# Patient Record
Sex: FEMALE | Race: WHITE | NOT HISPANIC OR LATINO | Employment: FULL TIME | URBAN - METROPOLITAN AREA
[De-identification: names, ages, dates, MRNs, and addresses within clinical notes are randomized per-mention and may not be internally consistent; named-entity substitution may affect disease eponyms.]

---

## 2018-11-14 ENCOUNTER — OFFICE VISIT (OUTPATIENT)
Dept: GYNECOLOGIC ONCOLOGY | Facility: CLINIC | Age: 23
End: 2018-11-14

## 2018-11-14 VITALS
RESPIRATION RATE: 16 BRPM | HEIGHT: 64 IN | WEIGHT: 160 LBS | DIASTOLIC BLOOD PRESSURE: 64 MMHG | HEART RATE: 106 BPM | SYSTOLIC BLOOD PRESSURE: 123 MMHG | OXYGEN SATURATION: 99 % | BODY MASS INDEX: 27.31 KG/M2 | TEMPERATURE: 98.2 F

## 2018-11-14 DIAGNOSIS — D03.8 MELANOMA IN SITU OF OTHER SITE (HCC): Primary | ICD-10-CM

## 2018-11-14 PROBLEM — D03.9 MELANOMA IN SITU (HCC): Status: ACTIVE | Noted: 2018-11-14

## 2018-11-14 PROCEDURE — 99204 OFFICE O/P NEW MOD 45 MIN: CPT | Performed by: OBSTETRICS & GYNECOLOGY

## 2018-11-14 NOTE — PROGRESS NOTES
Gauri Hester  [id number]  1995      Reason for visit:  Vulvar mole, melanoma in situ     Consultation:  Patient is being seen at the request of Dr. Cory Luu    History of present illness:  The patient is a 23 y.o. year old female who presents today for treatment and evaluation of the above issues.    Patient reports approximately 1.5 years ago she first noticed an abnormal mole on her right labia. She reports she noticed it several years prior to that and it was small and light brown in color. When she saw her primary OBGYN for an annual exam 1.5 years ago, she reports her provider told her it was bigger and darker in color and needed biopsy. She underwent biopsy in 2017. Pathology was significant for junctional melanocytic proliferation present at the margin site. She did experience breakdown and infection of the biopsy site that required antibiotics. In 2017, she was noted to be asymptomatic and pigmentation was gone. She was planning to proceed with another excisional procedure. She was then evaluated by a dermatologist in 2018. Per review of the chart, Dr. Bonilla (dermatology) noted absence of tissue of the lower third of the labia minora where the biopsy was performed. No pigmentation was noted at that time. A second opinion of the pathology report read the vulvar biopsy as malignant melanoma in situ.   The mole has not recently caused her any pain or discomfort. It does not itch or bleed. She denies irregular bleeding and dyspareunia.     For new patients, [facility] intake form was reviewed and confirmed today.    OBGYN History:  She is a .  She does use HRT. She does not have a history of abnormal pap smears. She has regular menses. She is not using birth control but is not actively trying to achieve pregnancy.       Oncologic History:   No history exists.         Past Medical History:   Diagnosis Date   • Melanoma in situ (CMS/HCC)        Past Surgical History:    Procedure Laterality Date   • VULVA SURGERY Right     excision RT vulva of melanoma in situ       MEDICATIONS: The current medication list was reviewed with the patient and updated in the EMR this date per the Medical Assistant. Medication dosages and frequencies were confirmed to be accurate.      Allergies:  has No Known Allergies.    Social History:   Social History     Socioeconomic History   • Marital status:      Spouse name: Not on file   • Number of children: 0   • Years of education: Not on file   • Highest education level: Not on file   Social Needs   • Financial resource strain: Not on file   • Food insecurity - worry: Not on file   • Food insecurity - inability: Not on file   • Transportation needs - medical: Not on file   • Transportation needs - non-medical: Not on file   Occupational History   • Not on file   Tobacco Use   • Smoking status: Not on file   Substance and Sexual Activity   • Alcohol use: Not on file   • Drug use: Not on file   • Sexual activity: Yes     Partners: Male     Birth control/protection: None   Other Topics Concern   • Not on file   Social History Narrative   • Not on file       Family History:  History reviewed. No pertinent family history.    Health Maintenance:    Health Maintenance   Topic Date Due   • ANNUAL PHYSICAL  11/05/1997   • HPV VACCINES (1 - Female 3-dose series) 11/05/2005   • TDAP/TD VACCINES (1 - Tdap) 11/05/2013   • INFLUENZA VACCINE  08/01/2018   • PAP SMEAR  11/14/2018         Review of Systems   Constitutional: Negative for appetite change, chills, fatigue, fever and unexpected weight change.   HENT: Negative for congestion, hearing loss and sore throat.    Eyes: Negative for redness and visual disturbance.   Respiratory: Negative for cough, shortness of breath and wheezing.    Cardiovascular: Negative for chest pain, palpitations and leg swelling.   Gastrointestinal: Negative for abdominal distention, abdominal pain, blood in stool, constipation,  "diarrhea, nausea and vomiting.   Endocrine: Negative for cold intolerance and heat intolerance.   Genitourinary: Positive for frequency. Negative for difficulty urinating, dyspareunia, dysuria, genital sores, hematuria, pelvic pain, urgency, vaginal bleeding, vaginal discharge and vaginal pain.   Musculoskeletal: Negative for arthralgias, back pain, gait problem and joint swelling.   Skin: Negative for rash and wound.   Allergic/Immunologic: Negative for food allergies and immunocompromised state.   Neurological: Negative for dizziness, seizures, syncope, weakness, light-headedness, numbness and headaches.   Hematological: Negative for adenopathy. Does not bruise/bleed easily.   Psychiatric/Behavioral: Negative for confusion, dysphoric mood and sleep disturbance. The patient is not nervous/anxious.        Physical Exam    Vitals:    11/14/18 1418   BP: 123/64   Pulse: 106   Resp: 16   Temp: 98.2 °F (36.8 °C)   TempSrc: Temporal   SpO2: 99%   Weight: 72.6 kg (160 lb)   Height: 161.3 cm (63.5\")     Body mass index is 27.9 kg/m².    Wt Readings from Last 3 Encounters:   11/14/18 72.6 kg (160 lb)         GENERAL: Alert, well-appearing female appearing her stated age who is in no apparent distress.   HEENT: Sclera anicteric. Head normocephalic, atraumatic. Mucus membranes moist.   NECK: Trachea midline, supple, without masses.    BREASTS: Deferred  CARDIOVASCULAR: Normal rate, regular rhythm, no murmurs, rubs, or gallops.  No peripheral edema.  RESPIRATORY: Clear to auscultation bilaterally, normal respiratory effort  BACK:  No CVA tenderness, no vertebral tenderness on palpation  GASTROINTESTINAL:  Abdomen is soft, non-tender, non-distended, no rebound or guarding, no masses, or hernias. No HSM.   SKIN:  Warm, dry, well-perfused.  All visible areas intact.  No rashes, lesions, ulcers.  PSYCHIATRIC: AO x3, with appropriate affect, normal thought processes.  NEUROLOGIC: No focal deficits.  Moves extremities " well.  MUSCULOSKELETAL: Normal gait and station.   EXTREMITIES:   No cyanosis, clubbing, symmetric.  LYMPHATICS:  No cervical or inguinal adenopathy noted.     PELVIC exam:    GYNECOLOGIC:  External genitalia: A small divot on the right labia minora where biopsy was performed was noted. No pigmentation visualized. Slight discoloration of tissue at the anterior edge of the biopsy site noted. On speculum examination, the cervix was free from lesion. No vaginal lesions noted.   On bimanual examination no mass was appreciated.  Uterus was normal in size and shape. There is no cervical motion or uterine tenderness. No cervical mass was palpated. Parametria were smooth. Rectovaginal exam was deferred.       Diagnostic Data:    Outside hospital pathology report personally reviewed by Dr. Mitchell and significant for melanoma in situ.           Assessment/Plan   This is a 23 y.o. woman with a history of vulvar melanoma in situ, + margin at prior excision, slightly abnormal pigmentation on examination today.  Discussed with patient and her  management options including expectant management versus surgical management with wide local excision. Recommended an excisional procedure given diagnosis of melanoma in situ and preference to achieve negative margins. Patient agreeable to the plan and desires surgical management. Discussed with patient the options of performing the procedure in the office versus in the outpatient surgery center. Patient states that she would be too nervous to have the procedure performed while she was awake. Will schedule in the outpatient surgery center. Patient advised to take one week off from work. Wound care discussed at length with the patient. She was prescribed inter dry and instructed how to use it. Patient was counseled that secondary to the location of the excision, completely symmetrical labia might not be able to be created for cosmesis. Patient expressed understanding.     Patient  was consented for wide local excision of the vulva.     Risks and benefits of surgery were discussed.  This included, but was not limited to, infection and bleeding like when the skin is cut; damage to surrounding structures; and incisional complications.  Risk of DVT was addressed for major surgeries.  Standard of care efforts to minimize these risks were reviewed.  Typical hospital stay and recovery were discussed as well as post-procedure precautions.  Surgical implications of chronic illnesses on recovery and surgical outcome were reviewed.     Patient verbalized understanding of the plan including the risks and benefits.  Appropriate perioperative testing including laboratory evaluation, EKG as clinically indicated, chest x-ray as clinically indicated, and preadmission evaluation were all ordered as a part of this patient's care.    Comorbidities  - None     Orders Placed This Encounter   Procedures   • SCANNED - LABS       FOLLOW UP: Surgery     Note: Speech recognition transcription software was used to dictate portions of this document.  An attempt at proofreading has been made though minor errors in transcription may still be present.  Please do not hesitate to call our office with any questions.    I personally spent 45 minutes face-to-face with the patient of which >50% was spent performing counseling/coordiantion of care.      Patient was seen and examined with Dr. Christopher,  resident, who performed portions of the examination and documentation for this patient's care under my direct supervision.  I agree with the above documentation and plan.    Samina Mitchell MD  11/14/18  9:43 PM

## 2018-11-16 ENCOUNTER — PREP FOR SURGERY (OUTPATIENT)
Dept: GYNECOLOGIC ONCOLOGY | Facility: CLINIC | Age: 23
End: 2018-11-16

## 2018-11-16 ENCOUNTER — PATIENT EDUCATION (SURGERY INSTRUCTIONS) (OUTPATIENT)
Dept: GYNECOLOGIC ONCOLOGY | Facility: CLINIC | Age: 23
End: 2018-11-16

## 2018-11-16 DIAGNOSIS — D03.9 MELANOMA IN SITU, UNSPECIFIED SITE (HCC): Primary | ICD-10-CM

## 2018-11-16 NOTE — PATIENT INSTRUCTIONS
Outpatient Pre-op Patient Education  *See checked boxes for your instructions*    Gauri Hester  [id number]  1995    SURGEON: Dr. Mitchell    Appointment  [x]  1. Your surgery has been scheduled on 11-26-18 at [facility] located at [address]. You will need to be there at 12:00 pm.   [x] 2.  You have pre-admission testing (PAT) appointment on 11-25-18 at 4:45 pm.  You will need to be at hospital registration 10 minutes before that time.     [x] 3.  The registration department is located in the long hallway between the [address].       The Day(s) Before Surgery  [x] 1.  Do not drink alcohol or smoke.     [x] 2.  Do not take vitamins or aspirin one week before surgery.       [x] 3.  If you are ill on the days leading up to your surgery, please call our office.      [x] 4.  If you are using medications for diabetes, call the physician who manages these and get instructions on how they should be taken before and after surgery.    [x] 5.  Nothing to eat or drink after midnight on 11-25-18.      [x] 6.  Please make prior arrangements for someone to drive you home after your procedure        The Day of Surgery  [x] 1.  Do not eat, drink, or chew gum.     [x] 2.  On the morning of your surgery, you may take her prescription medications with a sip of water. Bring all medication with you to the surgery center. (Diabetic patients should bring insulin if instructed to do so by their diabetes managing physician).   [x] 3. Bathe or shower the morning of your surgery. Do not use powders, lotions, or creams. Deodorants are okay.     [x] 4. Wear loose, comfortable clothing.     [x] 5. Bring holders for glasses, contacts, or dentures.      [x] 6. Bring any required payment and forms, including insurance cards. Leave all money and valuables at home.        Post-surgery Instructions  [x] 1.  For the first 24 hours, rest and take periodic deep breaths to remove anesthetic agents  from your body.   [x] 2.  Follow any specific instructions relevant to your particular surgery.     [x] 3.  Limit activity to avoid stress to the surgical site.      [x] 4.  Keep all dressings dry. Shower or bathe as instructed by your doctor.     [x] 5.  Drink and eat light foods. Remain on liquids alone only if nausea and vomiting occur. Return to your regular diet gradually, as tolerance allows.    [x] 6. Avoid alcohol for at least 24 hours.      [x] 7.  Take prescription pain medication as directed and with food.      [x] 8.  Call our office after discharge from the surgery center to make your post-op appointment.        In Case of Emergency:  Call our office if you experience any of the following:  - Excessive drainage, bleeding, swelling, or redness at the incision site  - Severe pain not eased by pain medication  - Temperature above 101  - Persistent nausea or vomiting  - Skin rash or general body itching

## 2018-11-19 ENCOUNTER — TELEPHONE (OUTPATIENT)
Dept: GYNECOLOGIC ONCOLOGY | Facility: CLINIC | Age: 23
End: 2018-11-19

## 2018-11-19 NOTE — TELEPHONE ENCOUNTER
Orders faxed successfully to [facility] for pt's upcoming outpt procedure 11-26-18 with Dr. Mitchell.

## 2018-11-25 ENCOUNTER — APPOINTMENT (OUTPATIENT)
Dept: PREADMISSION TESTING | Facility: HOSPITAL | Age: 23
End: 2018-11-25

## 2018-11-25 DIAGNOSIS — D03.9 MELANOMA IN SITU, UNSPECIFIED SITE (HCC): ICD-10-CM

## 2018-11-25 LAB
ALBUMIN SERPL-MCNC: 4.51 G/DL (ref 3.2–4.8)
ALBUMIN/GLOB SERPL: 2.1 G/DL (ref 1.5–2.5)
ALP SERPL-CCNC: 59 U/L (ref 25–100)
ALT SERPL W P-5'-P-CCNC: 19 U/L (ref 7–40)
ANION GAP SERPL CALCULATED.3IONS-SCNC: 4 MMOL/L (ref 3–11)
AST SERPL-CCNC: 17 U/L (ref 0–33)
BASOPHILS # BLD AUTO: 0.03 10*3/MM3 (ref 0–0.2)
BASOPHILS NFR BLD AUTO: 0.4 % (ref 0–1)
BILIRUB SERPL-MCNC: 0.4 MG/DL (ref 0.3–1.2)
BUN BLD-MCNC: 15 MG/DL (ref 9–23)
BUN/CREAT SERPL: 20 (ref 7–25)
CALCIUM SPEC-SCNC: 9.3 MG/DL (ref 8.7–10.4)
CHLORIDE SERPL-SCNC: 108 MMOL/L (ref 99–109)
CO2 SERPL-SCNC: 28 MMOL/L (ref 20–31)
CREAT BLD-MCNC: 0.75 MG/DL (ref 0.6–1.3)
DEPRECATED RDW RBC AUTO: 40.5 FL (ref 37–54)
EOSINOPHIL # BLD AUTO: 0.13 10*3/MM3 (ref 0–0.3)
EOSINOPHIL NFR BLD AUTO: 1.8 % (ref 0–3)
ERYTHROCYTE [DISTWIDTH] IN BLOOD BY AUTOMATED COUNT: 12.9 % (ref 11.3–14.5)
GFR SERPL CREATININE-BSD FRML MDRD: 95 ML/MIN/1.73
GLOBULIN UR ELPH-MCNC: 2.2 GM/DL
GLUCOSE BLD-MCNC: 94 MG/DL (ref 70–100)
HCG INTACT+B SERPL-ACNC: <5 MIU/ML
HCT VFR BLD AUTO: 38.6 % (ref 34.5–44)
HGB BLD-MCNC: 12.5 G/DL (ref 11.5–15.5)
IMM GRANULOCYTES # BLD: 0.01 10*3/MM3 (ref 0–0.03)
IMM GRANULOCYTES NFR BLD: 0.1 % (ref 0–0.6)
LYMPHOCYTES # BLD AUTO: 1.81 10*3/MM3 (ref 0.6–4.8)
LYMPHOCYTES NFR BLD AUTO: 25.2 % (ref 24–44)
MCH RBC QN AUTO: 27.9 PG (ref 27–31)
MCHC RBC AUTO-ENTMCNC: 32.4 G/DL (ref 32–36)
MCV RBC AUTO: 86.2 FL (ref 80–99)
MONOCYTES # BLD AUTO: 0.51 10*3/MM3 (ref 0–1)
MONOCYTES NFR BLD AUTO: 7.1 % (ref 0–12)
NEUTROPHILS # BLD AUTO: 4.69 10*3/MM3 (ref 1.5–8.3)
NEUTROPHILS NFR BLD AUTO: 65.5 % (ref 41–71)
PLATELET # BLD AUTO: 286 10*3/MM3 (ref 150–450)
PMV BLD AUTO: 10.5 FL (ref 6–12)
POTASSIUM BLD-SCNC: 3.9 MMOL/L (ref 3.5–5.5)
PROT SERPL-MCNC: 6.7 G/DL (ref 5.7–8.2)
RBC # BLD AUTO: 4.48 10*6/MM3 (ref 3.89–5.14)
SODIUM BLD-SCNC: 140 MMOL/L (ref 132–146)
WBC NRBC COR # BLD: 7.17 10*3/MM3 (ref 3.5–10.8)

## 2018-11-25 PROCEDURE — 36415 COLL VENOUS BLD VENIPUNCTURE: CPT

## 2018-11-25 PROCEDURE — 84702 CHORIONIC GONADOTROPIN TEST: CPT | Performed by: OBSTETRICS & GYNECOLOGY

## 2018-11-25 PROCEDURE — 85025 COMPLETE CBC W/AUTO DIFF WBC: CPT | Performed by: OBSTETRICS & GYNECOLOGY

## 2018-11-25 PROCEDURE — 80053 COMPREHEN METABOLIC PANEL: CPT | Performed by: OBSTETRICS & GYNECOLOGY

## 2018-11-26 ENCOUNTER — DOCUMENTATION (OUTPATIENT)
Dept: GYNECOLOGIC ONCOLOGY | Facility: CLINIC | Age: 23
End: 2018-11-26

## 2018-11-26 ENCOUNTER — OUTSIDE FACILITY SERVICE (OUTPATIENT)
Dept: GYNECOLOGIC ONCOLOGY | Facility: CLINIC | Age: 23
End: 2018-11-26

## 2018-11-26 ENCOUNTER — LAB REQUISITION (OUTPATIENT)
Dept: LAB | Facility: HOSPITAL | Age: 23
End: 2018-11-26

## 2018-11-26 DIAGNOSIS — D03.9 MELANOMA IN SITU (HCC): ICD-10-CM

## 2018-11-26 PROCEDURE — 56620 VULVECTOMY SIMPLE PARTIAL: CPT | Performed by: OBSTETRICS & GYNECOLOGY

## 2018-11-26 PROCEDURE — 88305 TISSUE EXAM BY PATHOLOGIST: CPT | Performed by: OBSTETRICS & GYNECOLOGY

## 2018-11-26 NOTE — PROGRESS NOTES
[facility]             [employer] PT SPOUSE FMLA FORMS AT    FORMS TO BE FAXED FOR HIM....     PLEASE CALL SPOUSE ONCE FORMS ARE PICKED UP OUT MA BOX (WORRIED NOT GETTING TURNED IN)     NO FORM FEE WAS COLLECTED     WANTED TO TALK TO RAMONA     FORMS PLACED IN MA BOX

## 2018-11-26 NOTE — PROGRESS NOTES
FMLA done today for .    Ranjana Farley signed and this was faxed to [employer] [place].       11--01-  6 weeks.         Paperwork for patient re faxed today. Just in case it wasn't received.

## 2018-11-27 LAB
CYTO UR: NORMAL
LAB AP CASE REPORT: NORMAL
LAB AP CLINICAL INFORMATION: NORMAL
PATH REPORT.FINAL DX SPEC: NORMAL
PATH REPORT.GROSS SPEC: NORMAL

## 2018-11-30 ENCOUNTER — TELEPHONE (OUTPATIENT)
Dept: GYNECOLOGIC ONCOLOGY | Facility: CLINIC | Age: 23
End: 2018-11-30

## 2018-11-30 NOTE — TELEPHONE ENCOUNTER
Samina Mitchell MD  [facility]             pls notify pt of path:  No melanoma      11/30/18:  I called to inform pt of the above results.  No answer.  I left her a message to call our office back.     11/30/18:  Pt called back.  Informed her above.  She verbalized understanding.

## 2018-12-03 ENCOUNTER — OFFICE VISIT (OUTPATIENT)
Dept: GYNECOLOGIC ONCOLOGY | Facility: CLINIC | Age: 23
End: 2018-12-03

## 2018-12-03 VITALS
DIASTOLIC BLOOD PRESSURE: 78 MMHG | SYSTOLIC BLOOD PRESSURE: 111 MMHG | BODY MASS INDEX: 28.07 KG/M2 | RESPIRATION RATE: 12 BRPM | OXYGEN SATURATION: 98 % | TEMPERATURE: 98.1 F | WEIGHT: 161 LBS | HEART RATE: 112 BPM

## 2018-12-03 DIAGNOSIS — Z98.890 POST-OPERATIVE STATE: Primary | ICD-10-CM

## 2018-12-03 PROCEDURE — 99024 POSTOP FOLLOW-UP VISIT: CPT | Performed by: NURSE PRACTITIONER

## 2018-12-03 NOTE — PROGRESS NOTES
GYN ONCOLOGY FOLLOW-UP    Gauri Hester  [id number]  1995    Chief Complaint: Post-op        History of present illness:  Gauri Hester is a 23 y.o. year old female who is here today for post-op visit. She is accompanied by her . She is s/p right wide local vulvar excision by Dr. Mitchell on 11/26/2018. Pathology returned showing residual focal melanocytic hyperplasia. Previous biopsy showed in-situ melanoma at this location. She feels she is healing very well and reports that this experience was much better than her previous procedure in the area. She has been practicing good home wound care and denies complications. She is not needing pain medication. She denies pain, drainage, swelling, or fevers. She would like to stay on with our office for ongoing observation and routine gynecologic care for now. She is not using birth control as she and her  are desiring of pregnancy. If pregnancy in the near future, she would like to continue with [facility] through [facility].          Past Medical History:   Diagnosis Date   • Melanoma in situ (CMS/HCC)        Past Surgical History:   Procedure Laterality Date   • VULVA SURGERY Right     excision RT vulva of melanoma in situ       MEDICATIONS: The current medication list was reviewed and reconciled.     Allergies:  has No Known Allergies.    History reviewed. No pertinent family history.      Review of Systems   Constitutional: Negative for appetite change, chills, fatigue, fever and unexpected weight change.   Respiratory: Negative for cough, shortness of breath and wheezing.    Cardiovascular: Negative for chest pain, palpitations and leg swelling.   Gastrointestinal: Negative for abdominal distention, abdominal pain, blood in stool, constipation, diarrhea, nausea and vomiting.   Endocrine: Negative.    Genitourinary: Negative for dyspareunia, dysuria, frequency, genital sores, hematuria, pelvic pain, urgency, vaginal bleeding, vaginal  discharge and vaginal pain.   Musculoskeletal: Negative for arthralgias, gait problem and joint swelling.   Neurological: Negative for dizziness, seizures, syncope, weakness, light-headedness, numbness and headaches.   Hematological: Negative for adenopathy.   Psychiatric/Behavioral: Negative.        Physical Exam  Vital Signs: /78   Pulse 112   Temp 98.1 °F (36.7 °C) (Temporal)   Resp 12   Wt 73 kg (161 lb)   SpO2 98%   BMI 28.07 kg/m²    General Appearance:  alert, cooperative, no apparent distress and appears stated age   Neurologic/Psychiatric: A&O x 3, gait steady, appropriate affect   HEENT:  Normocephalic, without obvious abnormality, mucous membranes moist   Lungs:   Clear to auscultation bilaterally; respirations regular, even, and unlabored bilaterally   Heart:  Regular rate and rhythm, no murmurs appreciated   Breasts:  deferred   Abdomen:   Soft, non-tender, non-distended and no organomegaly   Lymph nodes: No inguinal adenopathy noted   Extremities: Normal, atraumatic; no clubbing, cyanosis, or edema    Pelvic: External Genitalia  without lesions. Right labia minora excision site well approximated and healing appropriately. No erythema, edema, drainage, or induration noted.    Vagina  is pink, moist, without lesions.   Cervix  normal, without lesions, no discharge and no CMT  Uterus  normal size, midposition, mobile and nontender  Ovaries  without palpable masses or fullness  Parametria  smooth  Rectovaginal  Female rectovaginal: deferred         Procedure Notes:  No notes on file    Assessment and Plan:    Gauri was seen today for post-op.    Diagnoses and all orders for this visit:    Post-operative state        Patient, her , and I reviewed her pathology results. She is healing well and excision site is very well approximated. I feel that she is safe to return to work at this time. Encouraged to continue good home wound care as previously instructed and notify us of any concerns.  I am happy to continue to follow her in surveillance and for routine Gyn care until OB is needed. She is encouraged to take daily PNV if she is off of contraception and actively desiring of pregnancy. She v/u.     Return to clinic in 3 months for surveillance of vulvar melanoma in-situ.      Ranjana Farley, JOSHUA      Note: Speech recognition transcription software was used to dictate portions of this document.  An attempt at proofreading has been made though minor errors in transcription may still be present.  Please do not hesitate to call our office with any questions.

## 2019-03-11 ENCOUNTER — OFFICE VISIT (OUTPATIENT)
Dept: GYNECOLOGIC ONCOLOGY | Facility: CLINIC | Age: 24
End: 2019-03-11

## 2019-03-11 VITALS
BODY MASS INDEX: 25.28 KG/M2 | RESPIRATION RATE: 12 BRPM | TEMPERATURE: 97.7 F | OXYGEN SATURATION: 98 % | SYSTOLIC BLOOD PRESSURE: 119 MMHG | WEIGHT: 145 LBS | HEART RATE: 78 BPM | DIASTOLIC BLOOD PRESSURE: 72 MMHG

## 2019-03-11 DIAGNOSIS — D03.9 MELANOMA IN SITU, UNSPECIFIED SITE (HCC): Primary | ICD-10-CM

## 2019-03-11 PROCEDURE — 99213 OFFICE O/P EST LOW 20 MIN: CPT | Performed by: NURSE PRACTITIONER

## 2019-03-11 NOTE — PROGRESS NOTES
GYN ONCOLOGY CANCER SURVEILLANCE FOLLOW-UP    Gauri Hester  [id number]  1995    Chief Complaint: Follow-up (no complaints)        History of present illness:  Gauri Hester is a 23 y.o. year old female who is here today for ongoing surveillance of vulvar melanoma in situ, see Cancer History.  She is s/p right wide local vulvar excision by Dr. Mitchell on 11/26/2018. Pathology returned showing residual focal melanocytic hyperplasia. Previous biopsy showed in-situ melanoma at this location. She reports she is feeling very well today and has no complaints. She denies new lesions or any other gynecologic concerns. She is planning to establish care with [facility] soon for routine care and hopefully future OB care.          Cancer History:      Melanoma in situ (CMS/HCC)    11/14/2018 Initial Diagnosis     Melanoma in situ (CMS/HCC)         11/26/2018 Surgery     Surgery: Samina Mitchell MD    WLE of Right Vulva:      RIGHT LABIA MINORA LESION, EXCISIONAL BIOPSY:  Focal melanocytic hyperplasia; negative for in-situ and invasive melanoma              Past Medical History:   Diagnosis Date   • Melanoma in situ (CMS/HCC)        Past Surgical History:   Procedure Laterality Date   • VULVA SURGERY Right     excision RT vulva of melanoma in situ       MEDICATIONS: The current medication list was reviewed and reconciled.     Allergies:  has No Known Allergies.    No family history on file.      Review of Systems   Constitutional: Negative for appetite change, chills, fatigue, fever and unexpected weight change.   Respiratory: Negative for cough, shortness of breath and wheezing.    Cardiovascular: Negative for chest pain, palpitations and leg swelling.   Gastrointestinal: Negative for abdominal distention, abdominal pain, blood in stool, constipation, diarrhea, nausea and vomiting.   Endocrine: Negative.    Genitourinary: Negative for dyspareunia, dysuria, frequency, genital sores, hematuria, pelvic pain, urgency, vaginal  bleeding, vaginal discharge and vaginal pain.   Musculoskeletal: Negative for arthralgias, gait problem and joint swelling.   Neurological: Negative for dizziness, seizures, syncope, weakness, light-headedness, numbness and headaches.   Hematological: Negative for adenopathy.   Psychiatric/Behavioral: Negative.        Physical Exam  Vital Signs: /72   Pulse 78   Temp 97.7 °F (36.5 °C) (Temporal)   Resp 12   Wt 65.8 kg (145 lb)   SpO2 98%   BMI 25.28 kg/m²    General Appearance:  alert, cooperative, no apparent distress, appears stated age and normal weight   Neurologic/Psychiatric: A&O x 3, gait steady, appropriate affect   HEENT:  Normocephalic, without obvious abnormality, mucous membranes moist   Lungs:   Clear to auscultation bilaterally; respirations regular, even, and unlabored bilaterally   Heart:  Regular rate and rhythm, no murmurs appreciated   Breasts:  deferred   Abdomen:   Soft, non-tender, non-distended and no organomegaly   Lymph nodes: No inguinal adenopathy noted   Extremities: Normal, atraumatic; no clubbing, cyanosis, or edema    Pelvic: External Genitalia  without lesions or skin changes  Vagina  is pink, moist, without lesions.   Cervix  normal, without lesions, no discharge and no CMT  Uterus  normal size, midposition, mobile and nontender  Ovaries  without palpable masses or fullness  Parametria  smooth  Rectovaginal  Female rectovaginal: deferred       Procedure Note:  No notes on file      Assessment and Plan:  Gauri was seen today for follow-up.    Diagnoses and all orders for this visit:    Melanoma in situ, unspecified site (CMS/HCC)        There is no evidence of disease or recurrent lesions upon today's exam. She is understanding to call with any new lesions, changes in pelvic symptoms, or general GYN concerns at any time between regularly scheduled visits.       Return to clinic in 6 months for ongoing surveillance.      Ranjana Farley, JOSHUA        Note: Speech  recognition transcription software was used to dictate portions of this document.  An attempt at proofreading has been made though minor errors in transcription may still be present.  Please do not hesitate to call our office with any questions.

## 2019-09-09 ENCOUNTER — OFFICE VISIT (OUTPATIENT)
Dept: GYNECOLOGIC ONCOLOGY | Facility: CLINIC | Age: 24
End: 2019-09-09

## 2019-09-09 VITALS
SYSTOLIC BLOOD PRESSURE: 123 MMHG | TEMPERATURE: 98.1 F | HEART RATE: 104 BPM | RESPIRATION RATE: 16 BRPM | OXYGEN SATURATION: 99 % | WEIGHT: 137 LBS | BODY MASS INDEX: 23.89 KG/M2 | DIASTOLIC BLOOD PRESSURE: 71 MMHG

## 2019-09-09 DIAGNOSIS — R10.32 LLQ PAIN: ICD-10-CM

## 2019-09-09 DIAGNOSIS — D03.8 MELANOMA IN SITU OF OTHER SITE (HCC): Primary | ICD-10-CM

## 2019-09-09 PROCEDURE — 99214 OFFICE O/P EST MOD 30 MIN: CPT | Performed by: NURSE PRACTITIONER

## 2019-09-09 NOTE — PROGRESS NOTES
GYN ONCOLOGY FOLLOW-UP    Gauri Hester  [id number]  1995    Chief Complaint: Follow-up (pelvic discomfort, elly with intercourse)        History of present illness:  Gauri Hester is a 23 y.o. 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 She is s/p right wide local vulvar excision by Dr. Mitchell on 11/26/2018. Pathology returned showing residual focal melanocytic hyperplasia. Previous biopsy showed in-situ melanoma at this location. Since our last visit she has been following a low-carb diet, down 8 lbs. She c/o intermittent LLQ pain and sharp pelvic pain, comes and goes, worse with intercourse. Denies acute pain today. Patient denies new vulvar lesions.   Patient and her  planning to attempt conception next year. She is currently awaiting new patient appointment with [facility] in 11/2019 to establish OBGYN care.       Oncology History:       Melanoma in situ (CMS/HCC)    11/14/2018 Initial Diagnosis     Melanoma in situ (CMS/HCC)         11/26/2018 Surgery     Surgery: Samina Mitchell MD    WLE of Right Vulva:      RIGHT LABIA MINORA LESION, EXCISIONAL BIOPSY:  Focal melanocytic hyperplasia; negative for in-situ and invasive melanoma              Past Medical History:   Diagnosis Date   • Melanoma in situ (CMS/HCC)        Past Surgical History:   Procedure Laterality Date   • VULVA SURGERY Right     excision RT vulva of melanoma in situ       MEDICATIONS: The current medication list was reviewed and reconciled.     Allergies:  has No Known Allergies.    History reviewed. No pertinent family history.      Review of Systems   Constitutional: Negative for appetite change, chills, fatigue, fever and unexpected weight change.   Respiratory: Negative for cough, shortness of breath and wheezing.    Cardiovascular: Negative for chest pain, palpitations and leg swelling.   Gastrointestinal: Negative for abdominal distention, abdominal pain, blood in stool, constipation, diarrhea,  nausea and vomiting.   Endocrine: Negative.    Genitourinary: Positive for pelvic pain (occ with intercourse). Negative for dyspareunia, dysuria, frequency, genital sores, hematuria, urgency, vaginal bleeding, vaginal discharge and vaginal pain.   Musculoskeletal: Negative for arthralgias, gait problem and joint swelling.   Neurological: Negative for dizziness, seizures, syncope, weakness, light-headedness, numbness and headaches.   Hematological: Negative for adenopathy.   Psychiatric/Behavioral: Negative.        Physical Exam  Vital Signs: /71   Pulse 104   Temp 98.1 °F (36.7 °C) (Temporal)   Resp 16   Wt 62.1 kg (137 lb)   SpO2 99%   BMI 23.89 kg/m²   Pain Score    09/09/19 1322   PainSc: 0-No pain      General Appearance:  alert, cooperative, no apparent distress, appears stated age and normal weight   Neurologic/Psychiatric: A&O x 3, gait steady, appropriate affect   HEENT:  Normocephalic, without obvious abnormality, mucous membranes moist   Lungs:   Clear to auscultation bilaterally; respirations regular, even, and unlabored bilaterally   Heart:  Regular rate and rhythm, no murmurs appreciated   Breasts:  deferred   Abdomen:   Soft, non-tender, non-distended and no organomegaly   Lymph nodes: No inguinal adenopathy noted   Extremities: Normal, atraumatic; no clubbing, cyanosis, or edema    Pelvic: External Genitalia  without lesions or skin changes, changes consistent with prior excision  Vagina  is pink, moist, without lesions.   Cervix  normal, without lesions, no discharge and no CMT  Uterus  normal size, midposition, mobile and nontender  Ovaries  without palpable masses or fullness  Parametria  smooth  Rectovaginal  Female rectovaginal: deferred         Procedure Notes:  No notes on file    Assessment and Plan:    Gauri was seen today for follow-up.    Diagnoses and all orders for this visit:    Melanoma in situ of other site (CMS/HCC)    LLQ pain  -     US Non-ob Transvaginal;  Future        There is no evidence of disease or new lesions upon today's exam. We will continue to see her every 6 months in surveillance for until 2 years from her surgery. After that time, she may return to routine gyn care. She is understanding to call with any changes in pelvic symptoms or general GYN concerns at any time between regularly scheduled visits.     Discussed pt c/o LLQ pain. No tenderness or palpable masses upon bimanual exam. TVUS ordered for further evaluation. She will be notified of all results upon their return.     Keep new patient appointment with C scheduled in 11/2019.       Return to clinic in 6 months for ongoing surveillance.      JOSHUA Fry

## 2020-08-25 ENCOUNTER — TELEPHONE (OUTPATIENT)
Dept: OBSTETRICS AND GYNECOLOGY | Facility: CLINIC | Age: 25
End: 2020-08-25

## 2020-08-25 NOTE — TELEPHONE ENCOUNTER
DANILO scheduled with Dr. Lora on 9/30/2020, first pregnancy.  [phone] patient called complains of nausea and vomiting, she's been in the bed because she hasn't been able to keep anything down in over a day. I advised patient to go to the ER for IV fluids. Patient verbalized understanding.

## 2020-09-28 DIAGNOSIS — Z36.89 ENCOUNTER TO ESTABLISH GESTATIONAL AGE USING ULTRASOUND: Primary | ICD-10-CM

## 2020-09-30 ENCOUNTER — LAB (OUTPATIENT)
Dept: LAB | Facility: HOSPITAL | Age: 25
End: 2020-09-30

## 2020-09-30 ENCOUNTER — INITIAL PRENATAL (OUTPATIENT)
Dept: OBSTETRICS AND GYNECOLOGY | Facility: CLINIC | Age: 25
End: 2020-09-30

## 2020-09-30 ENCOUNTER — RESULTS ENCOUNTER (OUTPATIENT)
Dept: OBSTETRICS AND GYNECOLOGY | Facility: CLINIC | Age: 25
End: 2020-09-30

## 2020-09-30 VITALS — WEIGHT: 139 LBS | DIASTOLIC BLOOD PRESSURE: 62 MMHG | BODY MASS INDEX: 24.24 KG/M2 | SYSTOLIC BLOOD PRESSURE: 102 MMHG

## 2020-09-30 DIAGNOSIS — O21.9 NAUSEA AND VOMITING DURING PREGNANCY PRIOR TO 22 WEEKS GESTATION: ICD-10-CM

## 2020-09-30 DIAGNOSIS — Z34.01 ENCOUNTER FOR SUPERVISION OF NORMAL FIRST PREGNANCY IN FIRST TRIMESTER: ICD-10-CM

## 2020-09-30 DIAGNOSIS — Z34.01 ENCOUNTER FOR SUPERVISION OF NORMAL FIRST PREGNANCY IN FIRST TRIMESTER: Primary | ICD-10-CM

## 2020-09-30 LAB
ABO GROUP BLD: NORMAL
BACTERIA UR QL AUTO: ABNORMAL /HPF
BASOPHILS # BLD AUTO: 0.03 10*3/MM3 (ref 0–0.2)
BASOPHILS NFR BLD AUTO: 0.4 % (ref 0–1.5)
BILIRUB UR QL STRIP: NEGATIVE
BLD GP AB SCN SERPL QL: NEGATIVE
C TRACH RRNA SPEC DONR QL NAA+PROBE: NEGATIVE
CLARITY UR: ABNORMAL
COLOR UR: ABNORMAL
DEPRECATED RDW RBC AUTO: 38.4 FL (ref 37–54)
EOSINOPHIL # BLD AUTO: 0.09 10*3/MM3 (ref 0–0.4)
EOSINOPHIL NFR BLD AUTO: 1.3 % (ref 0.3–6.2)
ERYTHROCYTE [DISTWIDTH] IN BLOOD BY AUTOMATED COUNT: 12.7 % (ref 12.3–15.4)
GLUCOSE UR STRIP-MCNC: NEGATIVE MG/DL
HBA1C MFR BLD: 5.07 % (ref 4.8–5.6)
HBV SURFACE AG SERPL QL IA: NORMAL
HCT VFR BLD AUTO: 35.4 % (ref 34–46.6)
HCV AB SER DONR QL: NORMAL
HGB BLD-MCNC: 12.2 G/DL (ref 12–15.9)
HGB UR QL STRIP.AUTO: NEGATIVE
HIV1+2 AB SER QL: NORMAL
HYALINE CASTS UR QL AUTO: ABNORMAL /LPF
IMM GRANULOCYTES # BLD AUTO: 0.02 10*3/MM3 (ref 0–0.05)
IMM GRANULOCYTES NFR BLD AUTO: 0.3 % (ref 0–0.5)
KETONES UR QL STRIP: NEGATIVE
LEUKOCYTE ESTERASE UR QL STRIP.AUTO: ABNORMAL
LYMPHOCYTES # BLD AUTO: 1.5 10*3/MM3 (ref 0.7–3.1)
LYMPHOCYTES NFR BLD AUTO: 22.4 % (ref 19.6–45.3)
MCH RBC QN AUTO: 29 PG (ref 26.6–33)
MCHC RBC AUTO-ENTMCNC: 34.5 G/DL (ref 31.5–35.7)
MCV RBC AUTO: 84.1 FL (ref 79–97)
MONOCYTES # BLD AUTO: 0.5 10*3/MM3 (ref 0.1–0.9)
MONOCYTES NFR BLD AUTO: 7.5 % (ref 5–12)
N GONORRHOEA DNA SPEC QL NAA+PROBE: NEGATIVE
NEUTROPHILS NFR BLD AUTO: 4.56 10*3/MM3 (ref 1.7–7)
NEUTROPHILS NFR BLD AUTO: 68.1 % (ref 42.7–76)
NITRITE UR QL STRIP: NEGATIVE
NRBC BLD AUTO-RTO: 0 /100 WBC (ref 0–0.2)
PH UR STRIP.AUTO: 6.5 [PH] (ref 5–8)
PLATELET # BLD AUTO: 232 10*3/MM3 (ref 140–450)
PMV BLD AUTO: 12.2 FL (ref 6–12)
PROT UR QL STRIP: ABNORMAL
RBC # BLD AUTO: 4.21 10*6/MM3 (ref 3.77–5.28)
RBC # UR: ABNORMAL /HPF
REF LAB TEST METHOD: ABNORMAL
RH BLD: POSITIVE
SP GR UR STRIP: >=1.03 (ref 1–1.03)
SQUAMOUS #/AREA URNS HPF: ABNORMAL /HPF
TSH SERPL DL<=0.05 MIU/L-ACNC: 0.63 UIU/ML (ref 0.27–4.2)
UROBILINOGEN UR QL STRIP: ABNORMAL
WBC # BLD AUTO: 6.7 10*3/MM3 (ref 3.4–10.8)
WBC UR QL AUTO: ABNORMAL /HPF

## 2020-09-30 PROCEDURE — 36415 COLL VENOUS BLD VENIPUNCTURE: CPT

## 2020-09-30 PROCEDURE — 81001 URINALYSIS AUTO W/SCOPE: CPT | Performed by: OBSTETRICS & GYNECOLOGY

## 2020-09-30 PROCEDURE — 86901 BLOOD TYPING SEROLOGIC RH(D): CPT

## 2020-09-30 PROCEDURE — 86900 BLOOD TYPING SEROLOGIC ABO: CPT

## 2020-09-30 PROCEDURE — 84443 ASSAY THYROID STIM HORMONE: CPT

## 2020-09-30 PROCEDURE — 86850 RBC ANTIBODY SCREEN: CPT

## 2020-09-30 PROCEDURE — 83036 HEMOGLOBIN GLYCOSYLATED A1C: CPT

## 2020-09-30 PROCEDURE — 80081 OBSTETRIC PANEL INC HIV TSTG: CPT

## 2020-09-30 PROCEDURE — 86803 HEPATITIS C AB TEST: CPT

## 2020-09-30 PROCEDURE — 0501F PRENATAL FLOW SHEET: CPT | Performed by: OBSTETRICS & GYNECOLOGY

## 2020-09-30 RX ORDER — PROMETHAZINE HYDROCHLORIDE 25 MG/1
25 TABLET ORAL EVERY 6 HOURS PRN
Qty: 30 TABLET | Refills: 5 | Status: SHIPPED | OUTPATIENT
Start: 2020-09-30 | End: 2021-03-09 | Stop reason: HOSPADM

## 2020-09-30 RX ORDER — ONDANSETRON 4 MG/1
4 TABLET, FILM COATED ORAL DAILY PRN
Qty: 30 TABLET | Refills: 4 | Status: SHIPPED | OUTPATIENT
Start: 2020-09-30 | End: 2021-03-09 | Stop reason: HOSPADM

## 2020-09-30 NOTE — PROGRESS NOTES
@SUBJECTIVEBEGIN  Chief Complaint   Patient presents with   • Initial Prenatal Visit     Patient complains of nausea and vomiting.       Gauri Hester is a 24 y.o. year old .  Patient's last menstrual period was 07/15/2020 (exact date)..  She presents to be seen to initiate prenatal care.  She complains of breast tenderness, fatigue, frequent urination, morning sickness, nausea and positive home pregnancy test. These symptoms have been occurring for approximately 6 weeks.  She states that nothing helps to alleviate her symptoms.  Pre-existing conditions that could possibly affect the pregnancy are none.  Patient is having nausea and vomiting about 5-6 times per day.  She is requesting this medication help control this.  She is having no other pregnancy problems at this time.    Past Medical History:   Diagnosis Date   • Melanoma in situ (CMS/HCC)    ,   Past Surgical History:   Procedure Laterality Date   • VULVA SURGERY Right     excision RT vulva of melanoma in situ   ,   Family History   Problem Relation Age of Onset   • Diabetes Father    • Breast cancer Paternal Grandmother    • No Known Problems Mother    • No Known Problems Sister    • No Known Problems Brother    • No Known Problems Maternal Grandmother    • No Known Problems Maternal Grandfather    • No Known Problems Paternal Grandfather    ,   Social History     Tobacco Use   • Smoking status: Never Smoker   • Smokeless tobacco: Never Used   Substance Use Topics   • Alcohol use: Not Currently   • Drug use: Defer   , (Not in a hospital admission)   and Allergies:  Patient has no known allergies.    Social History    Tobacco Use      Smoking status: Never Smoker      Smokeless tobacco: Never Used      The following portions of the patient's history were reviewed and updated as appropriate:vital signs, allergies, current medications, past medical history, past social history, past surgical history and problem list.    Objective     Imaging    Vaginal ultrasound report    Review of Systems - History obtained from the patient  General ROS: positive for  - weight loss  Psychological ROS: positive for - anxiety and depression  ENT ROS: negative  Allergy and Immunology ROS: negative  Hematological and Lymphatic ROS: negative  Endocrine ROS: negative  Breast ROS: negative for breast lumps  Respiratory ROS: no cough, shortness of breath, or wheezing  Cardiovascular ROS: no chest pain or dyspnea on exertion  Gastrointestinal ROS: positive for - constipation and nausea/vomiting  Genito-Urinary ROS: no dysuria, trouble voiding, or hematuria  Musculoskeletal ROS: negative  Neurological ROS: no TIA or stroke symptoms  Dermatological ROS: negative     Physical Exam:  See Episode    Assessment/Plan   ASSESSMENT  Gauri was seen today for initial prenatal visit.    Diagnoses and all orders for this visit:    Encounter for supervision of normal first pregnancy in first trimester  -     Pap IG, Rfx HPV ASCU; Future  -     Hemoglobin A1c; Future  -     Hepatitis C Antibody; Future  -     HIV-1 / O / 2 Ag / Antibody 4th Generation; Future  -     TSH; Future  -     Urinalysis With Culture If Indicated - Urine, Clean Catch  -     Obstetric Panel; Future  -     Chlamydia trachomatis, Neisseria gonorrhoeae, Trichomonas vaginalis, PCR - Swab, Vagina; Future    Nausea and vomiting during pregnancy prior to 22 weeks gestation  -     promethazine (PHENERGAN) 25 MG tablet; Take 1 tablet by mouth Every 6 (Six) Hours As Needed for Nausea or Vomiting.  -     ondansetron (Zofran) 4 MG tablet; Take 1 tablet by mouth Daily As Needed for Nausea or Vomiting.        PLAN  1. Tests ordered today:  Orders Placed This Encounter   Procedures   • Chlamydia trachomatis, Neisseria gonorrhoeae, Trichomonas vaginalis, PCR - Swab, Vagina     Standing Status:   Future     Standing Expiration Date:   10/1/2021   • Hemoglobin A1c     Standing Status:   Future     Standing Expiration Date:    11/29/2020   • Hepatitis C Antibody     Standing Status:   Future     Standing Expiration Date:   11/29/2020   • HIV-1 / O / 2 Ag / Antibody 4th Generation     Standing Status:   Future     Standing Expiration Date:   11/29/2020   • TSH     Standing Status:   Future     Standing Expiration Date:   11/29/2020   • Urinalysis With Culture If Indicated - Urine, Clean Catch   • Obstetric Panel     Standing Status:   Future     Standing Expiration Date:   9/30/2021     2. Medications prescribed today:  New Medications Ordered This Visit   Medications   • promethazine (PHENERGAN) 25 MG tablet     Sig: Take 1 tablet by mouth Every 6 (Six) Hours As Needed for Nausea or Vomiting.     Dispense:  30 tablet     Refill:  5   • ondansetron (Zofran) 4 MG tablet     Sig: Take 1 tablet by mouth Daily As Needed for Nausea or Vomiting.     Dispense:  30 tablet     Refill:  4     3. Information reviewed: exercise in pregnancy, nutrition in pregnancy, weight gain in pregnancy, work and travel restrictions during pregnancy, list of OTC medications acceptable in pregnancy and call coverage groups  4. Genetic testing reviewed: she have considered the information and are not interested in having genetic testing performed.  5. The problem list for pregnancy was initiated today    Total time spent today with Gauri  was 50 minutes (level 4).  Off this time, > 50% was spent face-to-face time coordinating care, answering her questions and counseling regarding pathophysiology of her presenting problem along with plans for any diagnositc work-up and treatment.      Follow up: 2 week(s)       This note was electronically signed.    Thai Lora MD   September 30, 2020

## 2020-10-01 LAB
HOLD SPECIMEN: NORMAL
RPR SER QL: NORMAL
RUBV IGG SERPL IA-ACNC: POSITIVE

## 2020-10-06 DIAGNOSIS — Z34.01 ENCOUNTER FOR SUPERVISION OF NORMAL FIRST PREGNANCY IN FIRST TRIMESTER: ICD-10-CM

## 2020-10-14 ENCOUNTER — ROUTINE PRENATAL (OUTPATIENT)
Dept: OBSTETRICS AND GYNECOLOGY | Facility: CLINIC | Age: 25
End: 2020-10-14

## 2020-10-14 VITALS — DIASTOLIC BLOOD PRESSURE: 62 MMHG | WEIGHT: 141.6 LBS | BODY MASS INDEX: 24.69 KG/M2 | SYSTOLIC BLOOD PRESSURE: 104 MMHG

## 2020-10-14 DIAGNOSIS — Z34.01 ENCOUNTER FOR SUPERVISION OF NORMAL FIRST PREGNANCY IN FIRST TRIMESTER: Primary | ICD-10-CM

## 2020-10-14 DIAGNOSIS — O21.9 NAUSEA AND VOMITING DURING PREGNANCY PRIOR TO 22 WEEKS GESTATION: ICD-10-CM

## 2020-10-14 PROCEDURE — 0502F SUBSEQUENT PRENATAL CARE: CPT | Performed by: OBSTETRICS & GYNECOLOGY

## 2020-10-14 NOTE — PROGRESS NOTES
No results found for: ABORH, LABANTI, ABID    Chief Complaint   Patient presents with   • Routine Prenatal Visit     No complaints       HPI: Gauri is a  currently at 13w0d who today reports the following: Nausea-  YES; Contractions: No,   Vaginal bleeding- No; Heartburn- YES    Today Gauri complains of nausea with vomiting foccasionally  See ob flowsheet for physical exam.    Review of Systems - Negative except for present illness     Prenatal Assessment  Fetal Heart Rate: 151  Prenatal Vitals  BP: 104/62  Weight: 64.2 kg (141 lb 9.6 oz)  Vaginal Drainage  Draining Fluid: No  Edema  LLE Edema: None  RLE Edema: None  Facial Edema: None     Tests done today: none  At the time of the next visit, she will need to have none    Impression:   Encounter Diagnoses   Name Primary?   • Encounter for supervision of normal first pregnancy in first trimester Yes   • Nausea and vomiting during pregnancy prior to 22 weeks gestation        Plan: Return in 2 weeks    This note was electronically signed.    Thai Lora MD

## 2020-11-11 ENCOUNTER — ROUTINE PRENATAL (OUTPATIENT)
Dept: OBSTETRICS AND GYNECOLOGY | Facility: CLINIC | Age: 25
End: 2020-11-11

## 2020-11-11 VITALS — BODY MASS INDEX: 25.84 KG/M2 | SYSTOLIC BLOOD PRESSURE: 100 MMHG | WEIGHT: 148.2 LBS | DIASTOLIC BLOOD PRESSURE: 60 MMHG

## 2020-11-11 DIAGNOSIS — O21.9 NAUSEA AND VOMITING DURING PREGNANCY PRIOR TO 22 WEEKS GESTATION: ICD-10-CM

## 2020-11-11 DIAGNOSIS — Z34.02 ENCOUNTER FOR SUPERVISION OF NORMAL FIRST PREGNANCY IN SECOND TRIMESTER: Primary | ICD-10-CM

## 2020-11-11 DIAGNOSIS — Z36.3 ENCOUNTER FOR ANTENATAL SCREENING FOR MALFORMATION USING ULTRASOUND: ICD-10-CM

## 2020-11-11 PROCEDURE — 0502F SUBSEQUENT PRENATAL CARE: CPT | Performed by: OBSTETRICS & GYNECOLOGY

## 2020-11-11 NOTE — PROGRESS NOTES
No results found for: ABORH, LABANTI, ABID    Chief Complaint   Patient presents with   • Routine Prenatal Visit     Patient complains of nausea and vomiting despite taking zofran and phenergan.       HPI: Gauri is a  currently at 17w0d who today reports the following: Nausea-  YES; Contractions: No,   Vaginal bleeding- No; Heartburn- No    Today Gauri complains of nausea with vomiting occasionally  See ob flowsheet for physical exam.    Review of Systems - Negative except for present illness     Prenatal Assessment  Fetal Heart Rate: 138  Movement: Absent  Prenatal Vitals  BP: 100/60  Weight: 67.2 kg (148 lb 3.2 oz)  Vaginal Drainage  Draining Fluid: No  Edema  LLE Edema: None  RLE Edema: None  Facial Edema: None     Tests done today: none  At the time of the next visit, she will need to have U/S for anatomic screening - at [facility]    Impression:   Encounter Diagnoses   Name Primary?   • Encounter for supervision of normal first pregnancy in second trimester Yes   • Nausea and vomiting during pregnancy prior to 22 weeks gestation    • Encounter for  screening for malformation using ultrasound        Plan: Return in 4 weeks here and 2 weeks for US at [facility]    This note was electronically signed.    Thai Lora MD

## 2020-11-25 ENCOUNTER — APPOINTMENT (OUTPATIENT)
Dept: WOMENS IMAGING | Facility: HOSPITAL | Age: 25
End: 2020-11-25

## 2020-12-09 ENCOUNTER — OFFICE VISIT (OUTPATIENT)
Dept: OBSTETRICS AND GYNECOLOGY | Facility: HOSPITAL | Age: 25
End: 2020-12-09

## 2020-12-09 ENCOUNTER — ROUTINE PRENATAL (OUTPATIENT)
Dept: OBSTETRICS AND GYNECOLOGY | Facility: CLINIC | Age: 25
End: 2020-12-09

## 2020-12-09 ENCOUNTER — HOSPITAL ENCOUNTER (OUTPATIENT)
Dept: WOMENS IMAGING | Facility: HOSPITAL | Age: 25
Discharge: HOME | End: 2020-12-09
Admitting: OBSTETRICS & GYNECOLOGY

## 2020-12-09 VITALS
WEIGHT: 152 LBS | DIASTOLIC BLOOD PRESSURE: 71 MMHG | HEIGHT: 63 IN | SYSTOLIC BLOOD PRESSURE: 109 MMHG | BODY MASS INDEX: 26.93 KG/M2

## 2020-12-09 VITALS — WEIGHT: 152 LBS | DIASTOLIC BLOOD PRESSURE: 70 MMHG | BODY MASS INDEX: 27.36 KG/M2 | SYSTOLIC BLOOD PRESSURE: 102 MMHG

## 2020-12-09 DIAGNOSIS — D03.8 MELANOMA IN SITU OF OTHER SITE (HCC): Primary | ICD-10-CM

## 2020-12-09 DIAGNOSIS — Z36.3 ENCOUNTER FOR ANTENATAL SCREENING FOR MALFORMATION USING ULTRASOUND: ICD-10-CM

## 2020-12-09 DIAGNOSIS — Z34.02 ENCOUNTER FOR SUPERVISION OF NORMAL FIRST PREGNANCY IN SECOND TRIMESTER: Primary | ICD-10-CM

## 2020-12-09 LAB
GLUCOSE UR STRIP-MCNC: NEGATIVE MG/DL
PROT UR STRIP-MCNC: NEGATIVE MG/DL

## 2020-12-09 PROCEDURE — 76811 OB US DETAILED SNGL FETUS: CPT | Performed by: OBSTETRICS & GYNECOLOGY

## 2020-12-09 PROCEDURE — 0502F SUBSEQUENT PRENATAL CARE: CPT | Performed by: OBSTETRICS & GYNECOLOGY

## 2020-12-09 PROCEDURE — 76811 OB US DETAILED SNGL FETUS: CPT

## 2020-12-09 NOTE — PROGRESS NOTES
Patient seen in Maternal Fetal Medicine clinic today. Please see full note in under imaging tab of patient chart in Epic (Viewpoint report).    Julia Mckeon MD

## 2020-12-09 NOTE — PROGRESS NOTES
No results found for: ABORH, LABANTI, ABID    Chief Complaint   Patient presents with   • Routine Prenatal Visit     No complaints   • Pregnancy Ultrasound     Patient was seen over at [facility] this afternoon.       HPI: Gauri is a  currently at 21w0d who today reports the following: Nausea-  No; Contractions: No,   Vaginal bleeding- No; Heartburn- YES    Today Gauri complains of heartburn  See ob flowsheet for physical exam.    Review of Systems - Negative except for present illness     Prenatal Assessment  Fetal Heart Rate: [facility]-136  Movement: Present  Prenatal Vitals  BP: 102/70  Weight: 68.9 kg (152 lb)  Vaginal Drainage  Draining Fluid: Yes  Edema  LLE Edema: None  RLE Edema: None  Facial Edema: None     Tests done today: U/S for anatomic screening - anatomy completely seen today  At the time of the next visit, she will need to have none    Impression:   Encounter Diagnoses   Name Primary?   • Encounter for supervision of normal first pregnancy in second trimester Yes       Plan: Return in 4 weeks    This note was electronically signed.    Thai Lora MD

## 2021-01-13 ENCOUNTER — ROUTINE PRENATAL (OUTPATIENT)
Dept: OBSTETRICS AND GYNECOLOGY | Facility: CLINIC | Age: 26
End: 2021-01-13

## 2021-01-13 VITALS — BODY MASS INDEX: 29.52 KG/M2 | SYSTOLIC BLOOD PRESSURE: 104 MMHG | DIASTOLIC BLOOD PRESSURE: 70 MMHG | WEIGHT: 164 LBS

## 2021-01-13 DIAGNOSIS — Z34.02 ENCOUNTER FOR SUPERVISION OF NORMAL FIRST PREGNANCY IN SECOND TRIMESTER: Primary | ICD-10-CM

## 2021-01-13 LAB
GLUCOSE UR STRIP-MCNC: NEGATIVE MG/DL
PROT UR STRIP-MCNC: NEGATIVE MG/DL

## 2021-01-13 PROCEDURE — 0502F SUBSEQUENT PRENATAL CARE: CPT | Performed by: OBSTETRICS & GYNECOLOGY

## 2021-01-13 NOTE — PROGRESS NOTES
No results found for: ABORH, LABANTI, ABID    Chief Complaint   Patient presents with   • Routine Prenatal Visit     No complaints       HPI: Gauri is a  currently at 26w0d who today reports the following: Nausea-  No; Contractions: No,   Vaginal bleeding- No; Heartburn- YES    Today Gauri complains of heartburn  See ob flowsheet for physical exam.    Review of Systems - Negative except for present illness     Prenatal Assessment  Fetal Heart Rate: 147  Movement: Present  Prenatal Vitals  BP: 104/70  Weight: 74.4 kg (164 lb)     Tests done today: none  At the time of the next visit, she will need to have GCT    Impression:   Encounter Diagnoses   Name Primary?   • Encounter for supervision of normal first pregnancy in second trimester Yes       Plan: Return in 2 weeks    This note was electronically signed.    Thai Lora MD

## 2021-01-27 ENCOUNTER — ROUTINE PRENATAL (OUTPATIENT)
Dept: OBSTETRICS AND GYNECOLOGY | Facility: CLINIC | Age: 26
End: 2021-01-27

## 2021-01-27 ENCOUNTER — TELEPHONE (OUTPATIENT)
Dept: OBSTETRICS AND GYNECOLOGY | Facility: CLINIC | Age: 26
End: 2021-01-27

## 2021-01-27 ENCOUNTER — LAB (OUTPATIENT)
Dept: LAB | Facility: HOSPITAL | Age: 26
End: 2021-01-27

## 2021-01-27 VITALS — DIASTOLIC BLOOD PRESSURE: 78 MMHG | BODY MASS INDEX: 29.99 KG/M2 | SYSTOLIC BLOOD PRESSURE: 120 MMHG | WEIGHT: 166.6 LBS

## 2021-01-27 DIAGNOSIS — Z34.02 ENCOUNTER FOR SUPERVISION OF NORMAL FIRST PREGNANCY IN SECOND TRIMESTER: ICD-10-CM

## 2021-01-27 DIAGNOSIS — Z34.03 ENCOUNTER FOR SUPERVISION OF NORMAL FIRST PREGNANCY IN THIRD TRIMESTER: Primary | ICD-10-CM

## 2021-01-27 LAB — GLUCOSE 1H P 100 G GLC PO SERPL-MCNC: 107 MG/DL (ref 65–140)

## 2021-01-27 PROCEDURE — 36415 COLL VENOUS BLD VENIPUNCTURE: CPT

## 2021-01-27 PROCEDURE — 0502F SUBSEQUENT PRENATAL CARE: CPT | Performed by: OBSTETRICS & GYNECOLOGY

## 2021-01-27 PROCEDURE — 82950 GLUCOSE TEST: CPT

## 2021-01-27 RX ORDER — DOCUSATE SODIUM 100 MG/1
100 CAPSULE, LIQUID FILLED ORAL 2 TIMES DAILY
COMMUNITY
End: 2021-03-09 | Stop reason: HOSPADM

## 2021-01-27 NOTE — PROGRESS NOTES
No results found for: ABORH, LABANTI, ABID    Chief Complaint   Patient presents with   • Routine Prenatal Visit     No complaints       HPI: Gauri is a  currently at 28w0d who today reports the following: Nausea-  No; Contractions: No,   Vaginal bleeding- No; Heartburn- YES    Today Gauri complains of heartburn  See ob flowsheet for physical exam.    Review of Systems - Negative except for heartburn    Prenatal Assessment  Fetal Heart Rate: 151  Movement: Present  Prenatal Vitals  BP: 120/78  Weight: 75.6 kg (166 lb 9.6 oz)  Vaginal Drainage  Draining Fluid: Yes  Edema  LLE Edema: None  RLE Edema: None  Facial Edema: None     Tests done today: GCT  At the time of the next visit, she will need to have none    Impression:   Encounter Diagnoses   Name Primary?   • Encounter for supervision of normal first pregnancy in third trimester Yes       Plan: Return in 2 weeks    This note was electronically signed.    Thai Lora MD

## 2021-01-27 NOTE — TELEPHONE ENCOUNTER
Patient was called to discuss glucose screening test done today.  The result was 107.  Message was left to return the call and the results of the test was given.    Thai Lora MD

## 2021-02-17 ENCOUNTER — ROUTINE PRENATAL (OUTPATIENT)
Dept: OBSTETRICS AND GYNECOLOGY | Facility: CLINIC | Age: 26
End: 2021-02-17

## 2021-02-17 VITALS — DIASTOLIC BLOOD PRESSURE: 68 MMHG | SYSTOLIC BLOOD PRESSURE: 102 MMHG | WEIGHT: 171.6 LBS | BODY MASS INDEX: 30.89 KG/M2

## 2021-02-17 DIAGNOSIS — O47.00 PRETERM UTERINE CONTRACTIONS, ANTEPARTUM: ICD-10-CM

## 2021-02-17 DIAGNOSIS — Z34.03 ENCOUNTER FOR SUPERVISION OF NORMAL FIRST PREGNANCY IN THIRD TRIMESTER: Primary | ICD-10-CM

## 2021-02-17 LAB
GLUCOSE UR STRIP-MCNC: NEGATIVE MG/DL
PROT UR STRIP-MCNC: NEGATIVE MG/DL

## 2021-02-17 PROCEDURE — 0502F SUBSEQUENT PRENATAL CARE: CPT | Performed by: OBSTETRICS & GYNECOLOGY

## 2021-02-17 NOTE — PROGRESS NOTES
No results found for: ABORH, LABANTI, ABID    Chief Complaint   Patient presents with   • Routine Prenatal Visit     [place] contractions.       HPI: Gauri is a  currently at 31w0d who today reports the following: Nausea-  No; Contractions: YES,   Vaginal bleeding- No; Heartburn- No    Today Gauri complains of regular contractions   See ob flowsheet for physical exam.    Review of Systems - Negative except for contractions    Prenatal Assessment  Fetal Heart Rate: 150  Fundal Height (cm): 33 cm  Movement: Present  Presentation: Breech  Prenatal Vitals  BP: 102/68  Weight: 77.8 kg (171 lb 9.6 oz)  Urine Glucose/Protein  Urine Glucose Read-only: Negative  Urine Protein Read-only: Negative  Dilation/Effacement/Station  Dilation: Closed  Effacement (%): 30  Station: -3  Vaginal Drainage  Draining Fluid: Yes  Edema  LLE Edema: None  RLE Edema: None  Facial Edema: None     Tests done today: none  At the time of the next visit, she will need to have none    Impression:   Encounter Diagnoses   Name Primary?   • Encounter for supervision of normal first pregnancy in third trimester Yes   •  uterine contractions, antepartum        Plan: Return in 1 week, it was difficult to determine baby's presentation on pelvic exam and abdominal exam, she will return in 1 week if this is still uncertain will do a ultrasound to confirm presentation, patient cervix is not dilated, patient was told that if contractions become regular and continue to proceed to labor schuster, otherwise she will return in 1 week    This note was electronically signed.    Thai Lora MD

## 2021-02-24 ENCOUNTER — OFFICE VISIT (OUTPATIENT)
Dept: OBSTETRICS AND GYNECOLOGY | Facility: HOSPITAL | Age: 26
End: 2021-02-24

## 2021-02-24 ENCOUNTER — ROUTINE PRENATAL (OUTPATIENT)
Dept: OBSTETRICS AND GYNECOLOGY | Facility: CLINIC | Age: 26
End: 2021-02-24

## 2021-02-24 ENCOUNTER — HOSPITAL ENCOUNTER (OUTPATIENT)
Dept: WOMENS IMAGING | Facility: HOSPITAL | Age: 26
Discharge: HOME | End: 2021-02-24
Admitting: OBSTETRICS & GYNECOLOGY

## 2021-02-24 VITALS — WEIGHT: 172 LBS | BODY MASS INDEX: 30.96 KG/M2 | SYSTOLIC BLOOD PRESSURE: 104 MMHG | DIASTOLIC BLOOD PRESSURE: 76 MMHG

## 2021-02-24 VITALS — DIASTOLIC BLOOD PRESSURE: 80 MMHG | WEIGHT: 173 LBS | BODY MASS INDEX: 31.14 KG/M2 | SYSTOLIC BLOOD PRESSURE: 100 MMHG

## 2021-02-24 DIAGNOSIS — D03.8 MELANOMA IN SITU OF OTHER SITE (HCC): Primary | ICD-10-CM

## 2021-02-24 DIAGNOSIS — Z34.90 PREGNANCY, UNSPECIFIED GESTATIONAL AGE: ICD-10-CM

## 2021-02-24 DIAGNOSIS — D03.8 MELANOMA IN SITU OF OTHER SITE (HCC): ICD-10-CM

## 2021-02-24 DIAGNOSIS — Z53.21 PATIENT LEFT WITHOUT BEING SEEN: Primary | ICD-10-CM

## 2021-02-24 LAB
GLUCOSE UR STRIP-MCNC: NEGATIVE MG/DL
PROT UR STRIP-MCNC: NEGATIVE MG/DL

## 2021-02-24 PROCEDURE — 76816 OB US FOLLOW-UP PER FETUS: CPT

## 2021-02-24 PROCEDURE — 76811 OB US DETAILED SNGL FETUS: CPT | Performed by: OBSTETRICS & GYNECOLOGY

## 2021-02-24 PROCEDURE — 76811 OB US DETAILED SNGL FETUS: CPT

## 2021-02-24 NOTE — PROGRESS NOTES
Pt denies all s/s PTL, denies other problems.  To see OB today.  Declined genetic screening tests.

## 2021-02-24 NOTE — PROGRESS NOTES
Documentation of the ultasound findings, images, and interpretations will be available in the patient's Viewpoint report located in the Chart Review Imaging tab in [employer].

## 2021-03-01 ENCOUNTER — ROUTINE PRENATAL (OUTPATIENT)
Dept: OBSTETRICS AND GYNECOLOGY | Facility: CLINIC | Age: 26
End: 2021-03-01

## 2021-03-01 VITALS — WEIGHT: 175.8 LBS | BODY MASS INDEX: 31.64 KG/M2 | DIASTOLIC BLOOD PRESSURE: 80 MMHG | SYSTOLIC BLOOD PRESSURE: 108 MMHG

## 2021-03-01 DIAGNOSIS — Z34.03 ENCOUNTER FOR SUPERVISION OF NORMAL FIRST PREGNANCY IN THIRD TRIMESTER: Primary | ICD-10-CM

## 2021-03-01 DIAGNOSIS — D03.8 MELANOMA IN SITU OF OTHER SITE (HCC): ICD-10-CM

## 2021-03-01 LAB
GLUCOSE UR STRIP-MCNC: NEGATIVE MG/DL
PROT UR STRIP-MCNC: ABNORMAL MG/DL

## 2021-03-01 PROCEDURE — 0502F SUBSEQUENT PRENATAL CARE: CPT | Performed by: OBSTETRICS & GYNECOLOGY

## 2021-03-01 RX ORDER — CALCIUM CARBONATE 200(500)MG
1 TABLET,CHEWABLE ORAL DAILY
COMMUNITY
End: 2021-03-09 | Stop reason: HOSPADM

## 2021-03-01 RX ORDER — ACETAMINOPHEN 500 MG
500 TABLET ORAL EVERY 6 HOURS PRN
COMMUNITY

## 2021-03-01 NOTE — PROGRESS NOTES
No results found for: ABORH, LABANTI, ABID    Chief Complaint   Patient presents with   • Routine Prenatal Visit     No complaints       HPI: Gauri is a  currently at 32w5d who today reports the following: Nausea-  No; Contractions: No,   Vaginal bleeding- No; Heartburn- No    Today Gauri has no specific complaints  See ob flowsheet for physical exam.    Review of Systems - Negative except for present illness    Prenatal Assessment  Fetal Heart Rate: 153  Movement: Present  Presentation: Breech  Prenatal Vitals  BP: 108/80  Weight: 79.7 kg (175 lb 12.8 oz)  Urine Glucose/Protein  Urine Glucose Read-only: Negative  Urine Protein Read-only: (!) Trace  Vaginal Drainage  Draining Fluid: No  Edema  LLE Edema: None  RLE Edema: None  Facial Edema: None     Tests done today: none  At the time of the next visit, she will need to have none    Impression:   Encounter Diagnoses   Name Primary?   • Encounter for supervision of normal first pregnancy in third trimester Yes   • Melanoma in situ of other site (CMS/[facility])        Plan: Return in 2 weeks    This note was electronically signed.    Thai Lora MD

## 2021-03-05 ENCOUNTER — ANESTHESIA (OUTPATIENT)
Dept: LABOR AND DELIVERY | Facility: HOSPITAL | Age: 26
End: 2021-03-05

## 2021-03-05 ENCOUNTER — HOSPITAL ENCOUNTER (INPATIENT)
Facility: HOSPITAL | Age: 26
LOS: 4 days | Discharge: HOME | End: 2021-03-09
Attending: OBSTETRICS & GYNECOLOGY | Admitting: OBSTETRICS & GYNECOLOGY

## 2021-03-05 ENCOUNTER — ANESTHESIA EVENT (OUTPATIENT)
Dept: LABOR AND DELIVERY | Facility: HOSPITAL | Age: 26
End: 2021-03-05

## 2021-03-05 PROBLEM — Z34.90 PREGNANCY: Status: RESOLVED | Noted: 2021-02-24 | Resolved: 2021-03-05

## 2021-03-05 LAB
ABO GROUP BLD: NORMAL
ALP SERPL-CCNC: 160 U/L (ref 39–117)
ALT SERPL W P-5'-P-CCNC: 32 U/L (ref 1–33)
AST SERPL-CCNC: 37 U/L (ref 1–32)
BILIRUB SERPL-MCNC: <0.2 MG/DL (ref 0–1.2)
BLD GP AB SCN SERPL QL: NEGATIVE
CREAT SERPL-MCNC: 0.51 MG/DL (ref 0.57–1)
DEPRECATED RDW RBC AUTO: 40 FL (ref 37–54)
ERYTHROCYTE [DISTWIDTH] IN BLOOD BY AUTOMATED COUNT: 12.3 % (ref 12.3–15.4)
HCT VFR BLD AUTO: 34.5 % (ref 34–46.6)
HGB BLD-MCNC: 11.5 G/DL (ref 12–15.9)
LDH SERPL-CCNC: 216 U/L (ref 135–214)
MCH RBC QN AUTO: 29.9 PG (ref 26.6–33)
MCHC RBC AUTO-ENTMCNC: 33.3 G/DL (ref 31.5–35.7)
MCV RBC AUTO: 89.6 FL (ref 79–97)
PLATELET # BLD AUTO: 267 10*3/MM3 (ref 140–450)
PMV BLD AUTO: 10.6 FL (ref 6–12)
POC AMNISURE: POSITIVE
RBC # BLD AUTO: 3.85 10*6/MM3 (ref 3.77–5.28)
RH BLD: POSITIVE
SARS-COV-2 RDRP RESP QL NAA+PROBE: NORMAL
T&S EXPIRATION DATE: NORMAL
URATE SERPL-MCNC: 3.7 MG/DL (ref 2.4–5.7)
WBC # BLD AUTO: 13.12 10*3/MM3 (ref 3.4–10.8)

## 2021-03-05 PROCEDURE — 59510 CESAREAN DELIVERY: CPT | Performed by: OBSTETRICS & GYNECOLOGY

## 2021-03-05 PROCEDURE — 25010000002 MIDAZOLAM PER 1 MG: Performed by: NURSE ANESTHETIST, CERTIFIED REGISTERED

## 2021-03-05 PROCEDURE — 25010000003 MORPHINE PER 10 MG: Performed by: NURSE ANESTHETIST, CERTIFIED REGISTERED

## 2021-03-05 PROCEDURE — 86850 RBC ANTIBODY SCREEN: CPT | Performed by: OBSTETRICS & GYNECOLOGY

## 2021-03-05 PROCEDURE — S0260 H&P FOR SURGERY: HCPCS | Performed by: OBSTETRICS & GYNECOLOGY

## 2021-03-05 PROCEDURE — 84460 ALANINE AMINO (ALT) (SGPT): CPT | Performed by: OBSTETRICS & GYNECOLOGY

## 2021-03-05 PROCEDURE — 83615 LACTATE (LD) (LDH) ENZYME: CPT | Performed by: OBSTETRICS & GYNECOLOGY

## 2021-03-05 PROCEDURE — 86901 BLOOD TYPING SEROLOGIC RH(D): CPT | Performed by: OBSTETRICS & GYNECOLOGY

## 2021-03-05 PROCEDURE — 87635 SARS-COV-2 COVID-19 AMP PRB: CPT | Performed by: OBSTETRICS & GYNECOLOGY

## 2021-03-05 PROCEDURE — 25010000002 AZITHROMYCIN PER 500 MG: Performed by: OBSTETRICS & GYNECOLOGY

## 2021-03-05 PROCEDURE — 25010000002 KETOROLAC TROMETHAMINE PER 15 MG: Performed by: OBSTETRICS & GYNECOLOGY

## 2021-03-05 PROCEDURE — 82247 BILIRUBIN TOTAL: CPT | Performed by: OBSTETRICS & GYNECOLOGY

## 2021-03-05 PROCEDURE — 84550 ASSAY OF BLOOD/URIC ACID: CPT | Performed by: OBSTETRICS & GYNECOLOGY

## 2021-03-05 PROCEDURE — 25010000002 CEFAZOLIN PER 500 MG: Performed by: OBSTETRICS & GYNECOLOGY

## 2021-03-05 PROCEDURE — 25010000002 FENTANYL CITRATE (PF) 100 MCG/2ML SOLUTION: Performed by: NURSE ANESTHETIST, CERTIFIED REGISTERED

## 2021-03-05 PROCEDURE — 85027 COMPLETE CBC AUTOMATED: CPT | Performed by: OBSTETRICS & GYNECOLOGY

## 2021-03-05 PROCEDURE — 82565 ASSAY OF CREATININE: CPT | Performed by: OBSTETRICS & GYNECOLOGY

## 2021-03-05 PROCEDURE — 25010000002 KETOROLAC TROMETHAMINE PER 15 MG: Performed by: STUDENT IN AN ORGANIZED HEALTH CARE EDUCATION/TRAINING PROGRAM

## 2021-03-05 PROCEDURE — 59025 FETAL NON-STRESS TEST: CPT

## 2021-03-05 PROCEDURE — 59025 FETAL NON-STRESS TEST: CPT | Performed by: OBSTETRICS & GYNECOLOGY

## 2021-03-05 PROCEDURE — 25010000002 ONDANSETRON PER 1 MG: Performed by: NURSE ANESTHETIST, CERTIFIED REGISTERED

## 2021-03-05 PROCEDURE — 84075 ASSAY ALKALINE PHOSPHATASE: CPT | Performed by: OBSTETRICS & GYNECOLOGY

## 2021-03-05 PROCEDURE — 84112 EVAL AMNIOTIC FLUID PROTEIN: CPT | Performed by: OBSTETRICS & GYNECOLOGY

## 2021-03-05 PROCEDURE — 86900 BLOOD TYPING SEROLOGIC ABO: CPT | Performed by: OBSTETRICS & GYNECOLOGY

## 2021-03-05 PROCEDURE — 84450 TRANSFERASE (AST) (SGOT): CPT | Performed by: OBSTETRICS & GYNECOLOGY

## 2021-03-05 RX ORDER — ONDANSETRON 4 MG/1
4 TABLET, FILM COATED ORAL EVERY 6 HOURS PRN
Status: DISCONTINUED | OUTPATIENT
Start: 2021-03-05 | End: 2021-03-05 | Stop reason: HOSPADM

## 2021-03-05 RX ORDER — DIPHENHYDRAMINE HYDROCHLORIDE 50 MG/ML
25 INJECTION INTRAMUSCULAR; INTRAVENOUS EVERY 4 HOURS PRN
Status: DISCONTINUED | OUTPATIENT
Start: 2021-03-05 | End: 2021-03-09 | Stop reason: HOSPADM

## 2021-03-05 RX ORDER — ONDANSETRON 2 MG/ML
4 INJECTION INTRAMUSCULAR; INTRAVENOUS EVERY 6 HOURS PRN
Status: DISCONTINUED | OUTPATIENT
Start: 2021-03-05 | End: 2021-03-05 | Stop reason: HOSPADM

## 2021-03-05 RX ORDER — FAMOTIDINE 10 MG/ML
INJECTION, SOLUTION INTRAVENOUS AS NEEDED
Status: DISCONTINUED | OUTPATIENT
Start: 2021-03-05 | End: 2021-03-05 | Stop reason: SURG

## 2021-03-05 RX ORDER — ONDANSETRON 2 MG/ML
4 INJECTION INTRAMUSCULAR; INTRAVENOUS ONCE
Status: DISCONTINUED | OUTPATIENT
Start: 2021-03-05 | End: 2021-03-09 | Stop reason: HOSPADM

## 2021-03-05 RX ORDER — MISOPROSTOL 200 UG/1
800 TABLET ORAL AS NEEDED
Status: DISCONTINUED | OUTPATIENT
Start: 2021-03-05 | End: 2021-03-05 | Stop reason: HOSPADM

## 2021-03-05 RX ORDER — PROMETHAZINE HYDROCHLORIDE 25 MG/1
25 TABLET ORAL ONCE AS NEEDED
Status: DISCONTINUED | OUTPATIENT
Start: 2021-03-05 | End: 2021-03-09 | Stop reason: HOSPADM

## 2021-03-05 RX ORDER — OXYTOCIN-SODIUM CHLORIDE 0.9% IV SOLN 30 UNIT/500ML 30-0.9/5 UT/ML-%
SOLUTION INTRAVENOUS AS NEEDED
Status: DISCONTINUED | OUTPATIENT
Start: 2021-03-05 | End: 2021-03-05 | Stop reason: SURG

## 2021-03-05 RX ORDER — METHYLERGONOVINE MALEATE 0.2 MG/ML
200 INJECTION INTRAVENOUS ONCE AS NEEDED
Status: DISCONTINUED | OUTPATIENT
Start: 2021-03-05 | End: 2021-03-05 | Stop reason: HOSPADM

## 2021-03-05 RX ORDER — KETOROLAC TROMETHAMINE 15 MG/ML
15 INJECTION, SOLUTION INTRAMUSCULAR; INTRAVENOUS EVERY 6 HOURS
Status: COMPLETED | OUTPATIENT
Start: 2021-03-05 | End: 2021-03-06

## 2021-03-05 RX ORDER — DIPHENHYDRAMINE HYDROCHLORIDE 50 MG/ML
25 INJECTION INTRAMUSCULAR; INTRAVENOUS NIGHTLY PRN
Status: DISCONTINUED | OUTPATIENT
Start: 2021-03-05 | End: 2021-03-05 | Stop reason: HOSPADM

## 2021-03-05 RX ORDER — TRISODIUM CITRATE DIHYDRATE AND CITRIC ACID MONOHYDRATE 500; 334 MG/5ML; MG/5ML
30 SOLUTION ORAL ONCE
Status: COMPLETED | OUTPATIENT
Start: 2021-03-05 | End: 2021-03-05

## 2021-03-05 RX ORDER — OXYTOCIN-SODIUM CHLORIDE 0.9% IV SOLN 30 UNIT/500ML 30-0.9/5 UT/ML-%
85 SOLUTION INTRAVENOUS ONCE
Status: DISCONTINUED | OUTPATIENT
Start: 2021-03-05 | End: 2021-03-05 | Stop reason: HOSPADM

## 2021-03-05 RX ORDER — IBUPROFEN 600 MG/1
600 TABLET ORAL EVERY 6 HOURS
Status: DISCONTINUED | OUTPATIENT
Start: 2021-03-06 | End: 2021-03-09 | Stop reason: HOSPADM

## 2021-03-05 RX ORDER — SODIUM CHLORIDE, SODIUM LACTATE, POTASSIUM CHLORIDE, CALCIUM CHLORIDE 600; 310; 30; 20 MG/100ML; MG/100ML; MG/100ML; MG/100ML
125 INJECTION, SOLUTION INTRAVENOUS CONTINUOUS
Status: DISCONTINUED | OUTPATIENT
Start: 2021-03-05 | End: 2021-03-05

## 2021-03-05 RX ORDER — HYDROXYZINE HYDROCHLORIDE 25 MG/1
50 TABLET, FILM COATED ORAL EVERY 6 HOURS PRN
Status: DISCONTINUED | OUTPATIENT
Start: 2021-03-05 | End: 2021-03-09 | Stop reason: HOSPADM

## 2021-03-05 RX ORDER — DIPHENHYDRAMINE HCL 25 MG
25 CAPSULE ORAL NIGHTLY PRN
Status: DISCONTINUED | OUTPATIENT
Start: 2021-03-05 | End: 2021-03-05 | Stop reason: HOSPADM

## 2021-03-05 RX ORDER — PRENATAL VIT/IRON FUM/FOLIC AC 27MG-0.8MG
1 TABLET ORAL DAILY
Status: DISCONTINUED | OUTPATIENT
Start: 2021-03-05 | End: 2021-03-09 | Stop reason: HOSPADM

## 2021-03-05 RX ORDER — CALCIUM CARBONATE 200(500)MG
1 TABLET,CHEWABLE ORAL EVERY 6 HOURS PRN
Status: DISCONTINUED | OUTPATIENT
Start: 2021-03-05 | End: 2021-03-09 | Stop reason: HOSPADM

## 2021-03-05 RX ORDER — DIPHENHYDRAMINE HCL 25 MG
25 CAPSULE ORAL EVERY 4 HOURS PRN
Status: DISCONTINUED | OUTPATIENT
Start: 2021-03-05 | End: 2021-03-05

## 2021-03-05 RX ORDER — LANOLIN
CREAM (ML) TOPICAL
Status: DISCONTINUED | OUTPATIENT
Start: 2021-03-05 | End: 2021-03-09 | Stop reason: HOSPADM

## 2021-03-05 RX ORDER — ONDANSETRON 4 MG/1
4 TABLET, FILM COATED ORAL EVERY 8 HOURS PRN
Status: DISCONTINUED | OUTPATIENT
Start: 2021-03-05 | End: 2021-03-09 | Stop reason: HOSPADM

## 2021-03-05 RX ORDER — SODIUM CHLORIDE 0.9 % (FLUSH) 0.9 %
3 SYRINGE (ML) INJECTION EVERY 12 HOURS SCHEDULED
Status: DISCONTINUED | OUTPATIENT
Start: 2021-03-05 | End: 2021-03-05 | Stop reason: HOSPADM

## 2021-03-05 RX ORDER — MISOPROSTOL 200 UG/1
600 TABLET ORAL AS NEEDED
Status: DISCONTINUED | OUTPATIENT
Start: 2021-03-05 | End: 2021-03-09 | Stop reason: HOSPADM

## 2021-03-05 RX ORDER — HYDROMORPHONE HYDROCHLORIDE 1 MG/ML
0.5 INJECTION, SOLUTION INTRAMUSCULAR; INTRAVENOUS; SUBCUTANEOUS
Status: ACTIVE | OUTPATIENT
Start: 2021-03-05 | End: 2021-03-06

## 2021-03-05 RX ORDER — NALOXONE HCL 0.4 MG/ML
0.4 VIAL (ML) INJECTION
Status: ACTIVE | OUTPATIENT
Start: 2021-03-05 | End: 2021-03-06

## 2021-03-05 RX ORDER — ACETAMINOPHEN 500 MG
1000 TABLET ORAL ONCE
Status: COMPLETED | OUTPATIENT
Start: 2021-03-05 | End: 2021-03-05

## 2021-03-05 RX ORDER — ACETAMINOPHEN 325 MG/1
650 TABLET ORAL EVERY 6 HOURS
Status: DISCONTINUED | OUTPATIENT
Start: 2021-03-06 | End: 2021-03-09 | Stop reason: HOSPADM

## 2021-03-05 RX ORDER — OXYTOCIN-SODIUM CHLORIDE 0.9% IV SOLN 30 UNIT/500ML 30-0.9/5 UT/ML-%
650 SOLUTION INTRAVENOUS ONCE
Status: DISCONTINUED | OUTPATIENT
Start: 2021-03-05 | End: 2021-03-05 | Stop reason: HOSPADM

## 2021-03-05 RX ORDER — BUPIVACAINE HYDROCHLORIDE 7.5 MG/ML
INJECTION, SOLUTION EPIDURAL; RETROBULBAR AS NEEDED
Status: DISCONTINUED | OUTPATIENT
Start: 2021-03-05 | End: 2021-03-05 | Stop reason: SURG

## 2021-03-05 RX ORDER — ACETAMINOPHEN 500 MG
1000 TABLET ORAL EVERY 6 HOURS
Status: COMPLETED | OUTPATIENT
Start: 2021-03-05 | End: 2021-03-06

## 2021-03-05 RX ORDER — ONDANSETRON 2 MG/ML
INJECTION INTRAMUSCULAR; INTRAVENOUS AS NEEDED
Status: DISCONTINUED | OUTPATIENT
Start: 2021-03-05 | End: 2021-03-05 | Stop reason: SURG

## 2021-03-05 RX ORDER — SODIUM CHLORIDE 0.9 % (FLUSH) 0.9 %
10 SYRINGE (ML) INJECTION AS NEEDED
Status: DISCONTINUED | OUTPATIENT
Start: 2021-03-05 | End: 2021-03-05 | Stop reason: HOSPADM

## 2021-03-05 RX ORDER — HYDROCORTISONE 25 MG/G
1 CREAM TOPICAL AS NEEDED
Status: DISCONTINUED | OUTPATIENT
Start: 2021-03-05 | End: 2021-03-09 | Stop reason: HOSPADM

## 2021-03-05 RX ORDER — SODIUM CHLORIDE, SODIUM LACTATE, POTASSIUM CHLORIDE, CALCIUM CHLORIDE 600; 310; 30; 20 MG/100ML; MG/100ML; MG/100ML; MG/100ML
125 INJECTION, SOLUTION INTRAVENOUS CONTINUOUS
Status: DISCONTINUED | OUTPATIENT
Start: 2021-03-05 | End: 2021-03-09

## 2021-03-05 RX ORDER — OXYCODONE HYDROCHLORIDE 5 MG/1
5 TABLET ORAL EVERY 6 HOURS PRN
Status: DISCONTINUED | OUTPATIENT
Start: 2021-03-05 | End: 2021-03-09

## 2021-03-05 RX ORDER — KETOROLAC TROMETHAMINE 30 MG/ML
30 INJECTION, SOLUTION INTRAMUSCULAR; INTRAVENOUS ONCE
Status: COMPLETED | OUTPATIENT
Start: 2021-03-05 | End: 2021-03-05

## 2021-03-05 RX ORDER — SIMETHICONE 80 MG
80 TABLET,CHEWABLE ORAL 4 TIMES DAILY PRN
Status: DISCONTINUED | OUTPATIENT
Start: 2021-03-05 | End: 2021-03-09 | Stop reason: HOSPADM

## 2021-03-05 RX ORDER — OXYCODONE HYDROCHLORIDE 5 MG/1
10 TABLET ORAL EVERY 6 HOURS PRN
Status: DISCONTINUED | OUTPATIENT
Start: 2021-03-05 | End: 2021-03-09

## 2021-03-05 RX ORDER — MORPHINE SULFATE 0.5 MG/ML
INJECTION, SOLUTION EPIDURAL; INTRATHECAL; INTRAVENOUS AS NEEDED
Status: DISCONTINUED | OUTPATIENT
Start: 2021-03-05 | End: 2021-03-05 | Stop reason: SURG

## 2021-03-05 RX ORDER — METHYLERGONOVINE MALEATE 0.2 MG/ML
200 INJECTION INTRAVENOUS ONCE AS NEEDED
Status: DISCONTINUED | OUTPATIENT
Start: 2021-03-05 | End: 2021-03-09 | Stop reason: HOSPADM

## 2021-03-05 RX ORDER — MIDAZOLAM HYDROCHLORIDE 1 MG/ML
INJECTION INTRAMUSCULAR; INTRAVENOUS AS NEEDED
Status: DISCONTINUED | OUTPATIENT
Start: 2021-03-05 | End: 2021-03-05 | Stop reason: SURG

## 2021-03-05 RX ORDER — DIPHENHYDRAMINE HCL 25 MG
25 CAPSULE ORAL EVERY 4 HOURS PRN
Status: DISCONTINUED | OUTPATIENT
Start: 2021-03-05 | End: 2021-03-09 | Stop reason: HOSPADM

## 2021-03-05 RX ORDER — CARBOPROST TROMETHAMINE 250 UG/ML
250 INJECTION, SOLUTION INTRAMUSCULAR AS NEEDED
Status: DISCONTINUED | OUTPATIENT
Start: 2021-03-05 | End: 2021-03-05 | Stop reason: HOSPADM

## 2021-03-05 RX ORDER — PROMETHAZINE HYDROCHLORIDE 12.5 MG/1
12.5 SUPPOSITORY RECTAL ONCE AS NEEDED
Status: DISCONTINUED | OUTPATIENT
Start: 2021-03-05 | End: 2021-03-09 | Stop reason: HOSPADM

## 2021-03-05 RX ORDER — FENTANYL CITRATE 50 UG/ML
INJECTION, SOLUTION INTRAMUSCULAR; INTRAVENOUS AS NEEDED
Status: DISCONTINUED | OUTPATIENT
Start: 2021-03-05 | End: 2021-03-05 | Stop reason: SURG

## 2021-03-05 RX ORDER — DIPHENHYDRAMINE HYDROCHLORIDE 50 MG/ML
25 INJECTION INTRAMUSCULAR; INTRAVENOUS EVERY 4 HOURS PRN
Status: DISCONTINUED | OUTPATIENT
Start: 2021-03-05 | End: 2021-03-09

## 2021-03-05 RX ORDER — CARBOPROST TROMETHAMINE 250 UG/ML
250 INJECTION, SOLUTION INTRAMUSCULAR AS NEEDED
Status: DISCONTINUED | OUTPATIENT
Start: 2021-03-05 | End: 2021-03-09 | Stop reason: HOSPADM

## 2021-03-05 RX ORDER — CEFAZOLIN SODIUM 2 G/100ML
2 INJECTION, SOLUTION INTRAVENOUS ONCE
Status: COMPLETED | OUTPATIENT
Start: 2021-03-05 | End: 2021-03-05

## 2021-03-05 RX ORDER — LIDOCAINE HYDROCHLORIDE 10 MG/ML
5 INJECTION, SOLUTION EPIDURAL; INFILTRATION; INTRACAUDAL; PERINEURAL AS NEEDED
Status: DISCONTINUED | OUTPATIENT
Start: 2021-03-05 | End: 2021-03-05 | Stop reason: HOSPADM

## 2021-03-05 RX ORDER — OXYCODONE AND ACETAMINOPHEN 7.5; 325 MG/1; MG/1
1 TABLET ORAL EVERY 4 HOURS PRN
Status: DISCONTINUED | OUTPATIENT
Start: 2021-03-05 | End: 2021-03-05 | Stop reason: HOSPADM

## 2021-03-05 RX ORDER — HYDROMORPHONE HYDROCHLORIDE 1 MG/ML
0.5 INJECTION, SOLUTION INTRAMUSCULAR; INTRAVENOUS; SUBCUTANEOUS
Status: DISCONTINUED | OUTPATIENT
Start: 2021-03-05 | End: 2021-03-05 | Stop reason: HOSPADM

## 2021-03-05 RX ORDER — DOCUSATE SODIUM 100 MG/1
100 CAPSULE, LIQUID FILLED ORAL 2 TIMES DAILY PRN
Status: DISCONTINUED | OUTPATIENT
Start: 2021-03-05 | End: 2021-03-09 | Stop reason: HOSPADM

## 2021-03-05 RX ADMIN — AZITHROMYCIN 500 MG: 500 INJECTION, POWDER, LYOPHILIZED, FOR SOLUTION INTRAVENOUS at 13:08

## 2021-03-05 RX ADMIN — ONDANSETRON 4 MG: 2 INJECTION INTRAMUSCULAR; INTRAVENOUS at 13:16

## 2021-03-05 RX ADMIN — ACETAMINOPHEN 1000 MG: 500 TABLET ORAL at 13:02

## 2021-03-05 RX ADMIN — MORPHINE SULFATE 150 MCG: 0.5 INJECTION, SOLUTION EPIDURAL; INTRATHECAL; INTRAVENOUS at 13:21

## 2021-03-05 RX ADMIN — KETOROLAC TROMETHAMINE 30 MG: 30 INJECTION, SOLUTION INTRAMUSCULAR; INTRAVENOUS at 15:48

## 2021-03-05 RX ADMIN — OXYTOCIN 500 ML: 10 INJECTION INTRAVENOUS at 13:58

## 2021-03-05 RX ADMIN — BUPIVACAINE HYDROCHLORIDE 1.4 ML: 7.5 INJECTION, SOLUTION EPIDURAL; RETROBULBAR at 13:21

## 2021-03-05 RX ADMIN — FAMOTIDINE 20 MG: 10 INJECTION, SOLUTION INTRAVENOUS at 13:16

## 2021-03-05 RX ADMIN — SODIUM CHLORIDE, POTASSIUM CHLORIDE, SODIUM LACTATE AND CALCIUM CHLORIDE 1000 ML: 600; 310; 30; 20 INJECTION, SOLUTION INTRAVENOUS at 12:49

## 2021-03-05 RX ADMIN — CEFAZOLIN 2 G: 10 INJECTION, POWDER, FOR SOLUTION INTRAVENOUS at 12:50

## 2021-03-05 RX ADMIN — SODIUM CHLORIDE, POTASSIUM CHLORIDE, SODIUM LACTATE AND CALCIUM CHLORIDE 2000 ML/HR: 600; 310; 30; 20 INJECTION, SOLUTION INTRAVENOUS at 13:06

## 2021-03-05 RX ADMIN — FENTANYL CITRATE 15 MCG: 50 INJECTION, SOLUTION INTRAMUSCULAR; INTRAVENOUS at 13:21

## 2021-03-05 RX ADMIN — SODIUM CITRATE AND CITRIC ACID MONOHYDRATE 30 ML: 500; 334 SOLUTION ORAL at 13:09

## 2021-03-05 RX ADMIN — MIDAZOLAM 1 MG: 1 INJECTION INTRAMUSCULAR; INTRAVENOUS at 13:55

## 2021-03-05 RX ADMIN — KETOROLAC TROMETHAMINE 15 MG: 15 INJECTION, SOLUTION INTRAMUSCULAR; INTRAVENOUS at 22:35

## 2021-03-05 RX ADMIN — ACETAMINOPHEN 1000 MG: 500 TABLET ORAL at 19:03

## 2021-03-05 NOTE — ANESTHESIA POSTPROCEDURE EVALUATION
Patient: Gauri Hester    Procedure Summary     Date: 21 Room / Location: [facility] LABOR DELIVERY   [facility]    Anesthesia Start:  Anesthesia Stop:     Procedure:  SECTION PRIMARY (N/A Abdomen) Diagnosis:     Surgeon: Thai Lora MD Provider: Haris Mariscal DO    Anesthesia Type: ITN, spinal ASA Status: 2 - Emergent          Anesthesia Type: ITN, spinal    Vitals  Vitals Value Taken Time   /56 21 1416   Temp 97.6    Pulse 112 21 1418   Resp 17    SpO2 99 % 21 1418   Vitals shown include unvalidated device data.        Post Anesthesia Care and Evaluation    Patient location during evaluation: bedside  Patient participation: complete - patient participated  Level of consciousness: awake and alert  Pain management: adequate  Airway patency: patent  Anesthetic complications: No anesthetic complications    Cardiovascular status: acceptable  Respiratory status: acceptable  Hydration status: acceptable

## 2021-03-05 NOTE — H&P
[place]  Obstetric History and Physical    Chief Complaint   Patient presents with   • Rupture of Membranes       Subjective     Patient is a 25 y.o. female  currently at 33w2d, who presents with P PROM occurring at 10:00 this morning.  Patient has known breech presentation.    Her prenatal care is reportedly benign to date. Her previous obstetric/gynecological history is noncontributory.    The following portions of the patients history were reviewed and updated as appropriate: current medications, allergies, past medical history, past surgical history, past family history, past social history and problem list .        Prenatal Information:   Maternal Prenatal Labs  Blood Type No results found for: ABO   Rh Status No results found for: RH   Antibody Screen No results found for: ABSCRN   Gonnorhea No results found for: GCCX   Chlamydia No results found for: CLAMYDCU   RPR No results found for: RPR   Syphilis Antibody No results found for: SYPHILIS   Rubella No results found for: RUBELLAIGGIN   Hepatitis B Surface Antigen No results found for: HEPBSAG   HIV-1 Antibody No results found for: LABHIV1   Hepatitis C Antibody No results found for: HEPCAB   Rapid Urin Drug Screen No results found for: AMPMETHU, BARBITSCNUR, LABBENZSCN, LABMETHSCN, LABOPIASCN, THCURSCR, COCAINEUR, AMPHETSCREEN, PROPOXSCN, BUPRENORSCNU, METAMPSCNUR, OXYCODONESCN, TRICYCLICSCN   Group B Strep Culture No results found for: GBSANTIGEN           External Prenatal Results     Pregnancy Outside Results - Transcribed From Office Records - See Scanned Records For Details     Test Value Date Time    Hgb 12.2 g/dL 20 1502    Hct 35.4 % 20 1502    ABO A  20 1502    Rh Positive  20 1502    Antibody Screen Negative  20 1502    Glucose Fasting GTT       Glucose Tolerance Test 1 hour       Glucose Tolerance Test 3 hour       Gonorrhea (discrete) Negative  20     Chlamydia (discrete) Negative  20     RPR  Non-Reactive  20 1502    VDRL       Syphilis Antibody       Rubella Positive  20 1502    HBsAg Non-Reactive  20 1502    Herpes Simplex Virus PCR       Herpes Simplex VIrus Culture       HIV Non-Reactive  20 1502    Hep C RNA Quant PCR       Hep C Antibody Non-Reactive  20 1502    AFP       Group B Strep       GBS Susceptibility to Clindamycin       GBS Susceptibility to Erythromycin       Fetal Fibronectin       Genetic Testing, Maternal Blood             Drug Screening     Test Value Date Time    Urine Drug Screen       Amphetamine Screen       Barbiturate Screen       Benzodiazepine Screen       Methadone Screen       Phencyclidine Screen       Opiates Screen       THC Screen       Cocaine Screen       Propoxyphene Screen       Buprenorphine Screen       Methamphetamine Screen       Oxycodone Screen       Tricyclic Antidepressants Screen                     Past OB History:       OB History    Para Term  AB Living   1 0 0 0 0 0   SAB TAB Ectopic Molar Multiple Live Births   0 0 0 0 0 0      # Outcome Date GA Lbr Wan/2nd Weight Sex Delivery Anes PTL Lv   1 Current                Past Medical History:  Past Medical History:   Diagnosis Date   • Hemorrhoids during pregnancy in third trimester    • Melanoma in situ (CMS/HCC) 2019    right labia      Past Surgical History Past Surgical History:   Procedure Laterality Date   • VULVA SURGERY Right     excision RT vulva of melanoma in situ      Family History: Family History   Problem Relation Age of Onset   • Diabetes Father    • Breast cancer Paternal Grandmother    • No Known Problems Mother    • No Known Problems Sister    • No Known Problems Brother    • No Known Problems Maternal Grandmother    • No Known Problems Maternal Grandfather    • No Known Problems Paternal Grandfather       Social History:  reports that she has never smoked. She has never used smokeless tobacco.   reports previous alcohol use.   reports no  history of drug use.   Allergies: Patient has no known allergies.  Current Medications:          No current facility-administered medications on file prior to encounter.      Current Outpatient Medications on File Prior to Encounter   Medication Sig Dispense Refill   • acetaminophen (TYLENOL) 500 MG tablet Take 500 mg by mouth Every 6 (Six) Hours As Needed for Mild Pain .     • calcium carbonate (TUMS) 500 MG chewable tablet Chew 1 tablet Daily.     • docusate sodium (COLACE) 100 MG capsule Take 100 mg by mouth 2 (Two) Times a Day.     • ondansetron (Zofran) 4 MG tablet Take 1 tablet by mouth Daily As Needed for Nausea or Vomiting. 30 tablet 4   • Prenatal Vit-Fe Fumarate-FA (PRENATAL VITAMIN PO) Take  by mouth.     • promethazine (PHENERGAN) 25 MG tablet Take 1 tablet by mouth Every 6 (Six) Hours As Needed for Nausea or Vomiting. 30 tablet 5       General ROS: Pertinent items are noted in HPI, all other systems reviewed and negative    Objective       Vital Signs Range for the last 24 hours  Temperature: Temp:  [98.2 °F (36.8 °C)] 98.2 °F (36.8 °C)   Temp Source: Temp src: Oral   BP: BP: (118)/(75) 118/75   Pulse: Heart Rate:  [108] 108   Respirations: Resp:  [18] 18   SPO2:     O2 Amount (l/min):     O2 Devices     Weight: Weight:  [79.4 kg (175 lb)] 79.4 kg (175 lb)     Physical Examination: General appearance - alert, well appearing, and in no distress, oriented to person, place, and time and normal appearing weight  Mental status - alert, oriented to person, place, and time, normal mood, behavior, speech, dress, motor activity, and thought processes  Eyes - pupils equal and reactive, extraocular eye movements intact, sclera anicteric  Neck - supple, no significant adenopathy, thyroid exam: thyroid is normal in size without nodules or tenderness  Chest - clear to auscultation, no wheezes, rales or rhonchi, symmetric air entry  Heart - normal rate, regular rhythm, normal S1, S2, no murmurs, rubs, clicks or  gallops  Abdomen-soft, nontender, nondistended, no masses or organomegaly   Abdomen, Non-Tender  Pelvic - VULVA: normal appearing vulva with no masses, tenderness or lesions, CERVIX: 6-7 cm / 80%/-1 station.  Gross rupture of membranes confirmed.  Footling breech presentation noted at time of examination and confirmed with bedside ultrasound.  Neurological - alert, oriented, normal speech, no focal findings or movement disorder noted, DTR's normal and symmetric  Extremities - no pedal edema noted    Fetal Heart Rate Assessment  Indication: P PROM   Start Time: 1150                end Time: 1233   NST Results: Reactive NST.  Baseline fetal heart rate 140-150 bpm.  Average variability with multiple 15 x 15 accelerations.  No decelerations.  No regular uterine contraction pattern noted.        Laboratory Results:   Lab Results   Component Value Date    ALKPHOS 59 2018    ALT 19 2018    AST 17 2018    CREATININE 0.75 2018    BILITOT 0.4 2018       No results found for: WBC, RBC, HGB, HCT, MCV, MCH, MCHC, RDW, RDWSD, MPV, PLT, NEUTRORELPCT, LYMPHORELPCT, MONORELPCT, EOSRELPCT, BASORELPCT, AUTOIGPER, NEUTROABS, LYMPHSABS, MONOSABS, EOSABS, BASOSABS, AUTOIGNUM, NRBC          Brief Urine Lab Results  (Last result in the past 365 days)      Color   Clarity   Blood   Leuk Est   Nitrite   Protein   CREAT   Urine HCG        21           Trace                 Radiology Review: Bedside ultrasound confirms breech presentation.  Other Studies: AmniSure performed, results positive.  Rupture of membranes is confirmed.    Assessment/Plan        PROM w/onset labor within 24 hours rupture in 3rd trimester        Assessment:  1. P PROM at 33 weeks 2 days gestation.  Advanced cervical dilation noted at time of examination.  2. Footling breech presentation.    Plan:  1. We will proceed with primary  section secondary to footling breech presentation with P PROM and advanced cervical  "dilation at 33 weeks 2 days gestation.     Total time spent today with Gauri  was 30-39 minutes (level 4).  Of this time, > 50% was spent face-to-face time coordinating care, answering her questions and counseling regarding pathophysiology of her presenting problem along with plans for any diagnostic work-up and treatment.        Sebastien Howell \"[place]\" CHARLY Rose MD  3/5/2021  12:31 EST    "

## 2021-03-05 NOTE — ANESTHESIA PROCEDURE NOTES
Spinal Block      Patient reassessed immediately prior to procedure    Patient location during procedure: OR  Indication:at surgeon's request  Performed By  CRNA: Prabha Leblanc CRNA  Preanesthetic Checklist  Completed: patient identified, surgical consent, pre-op evaluation, timeout performed, IV checked, risks and benefits discussed and monitors and equipment checked  Spinal Block Prep:  Patient Position:sitting  Sterile Tech:cap, gloves, mask and sterile barriers  Prep:Betadine  Patient Monitoring:blood pressure monitoring, continuous pulse oximetry and EKG  Spinal Block Procedure  Approach:midline  Guidance:palpation technique  Location:L3-L4  Needle Type:Soniya  Needle Gauge:25 G  Placement of Spinal needle event:cerebrospinal fluid aspirated  Paresthesia: no  Fluid Appearance:clear     Post Assessment  Patient Tolerance:patient tolerated the procedure well with no apparent complications  Complications no

## 2021-03-05 NOTE — ANESTHESIA PREPROCEDURE EVALUATION
Anesthesia Evaluation     NPO Solid Status: > 6 hours  NPO Liquid Status: > 6 hours           Airway   Mallampati: II  TM distance: >3 FB  Neck ROM: full  Dental      Pulmonary - negative pulmonary ROS   Cardiovascular         Neuro/Psych- negative ROS  GI/Hepatic/Renal/Endo    (+) obesity,       Musculoskeletal (-) negative ROS    Abdominal    Substance History - negative use     OB/GYN    (+) Pregnant,         Other - negative ROS                       Anesthesia Plan    ASA 2 - emergent     ITN and spinal       Anesthetic plan, all risks, benefits, and alternatives have been provided, discussed and informed consent has been obtained with: patient.  Use of blood products discussed with patient .   Plan discussed with attending.

## 2021-03-05 NOTE — OP NOTE
Section Procedure Note    Gauri Hester    3/6/2021     Indications: 1. IUP at 33w2d   2. PPROM 3. Breech presentation    Pre-operative Diagnosis: 33w2d    Post-operative Diagnosis: same    Findings: normal appearing uterus, ovaries, and fallopian tubes    Estimated Blood Loss:  502           Drains: Reina                 Specimens: None               Complications:  None; patient tolerated the procedure well.           Disposition: PACU - hemodynamically stable.           Condition: stable    Surgeon: Dr. Lora    Assistants: Dr. Corona    Anesthesia: Spinal anesthesia    ASA Class: 1    Procedure Details   The patient was seen in the Holding Room. The risks, benefits, complications, treatment options, and expected outcomes were discussed with the patient.  The patient concurred with the proposed plan, giving informed consent.  The site of surgery was properly noted. The patient was taken to the Operating Room, identified as Gauri Hester and the procedure verified as  Delivery. A Time Out was held and the above information confirmed.    After induction of anesthesia, the patient was draped and prepped in the usual sterile manner. A Pfannenstiel incision was made and carried down through the subcutaneous tissue to the fascia. A fascial incision was made and extended transversely. The fascia was  from the underlying rectus tissue superiorly and inferiorly. The peritoneum was identified and entered. The peritoneal incision was extended longitudinally.  A bladder flap was turned down.  A low transverse uterine incision was made. Delivered from breech presentation was a male infant with a birth weight of 2280 g (5 lb 0.4 oz)  apgar scores are pending.        APGARS  One minute Five minutes Ten minutes Fifteen minutes Twenty minutes   Skin color: 1   1             Heart rate: 2   2             Grimace: 1   2              Muscle tone: 2   2              Breathin   2              Totals:  7   9              . After the umbilical cord was clamped and cut, cord blood was obtained for evaluation. The placenta was removed intact and appeared normal. The uterine outline, tubes and ovaries appeared normal. The uterine incision was closed with running locked sutures of 1-0 Chromic and an imbricating layer of 1-0 Chromic. Hemostasis was observed. The peritoneum was reapproximated with 2-0 Vicryl. The fascia was then reapproximated with running sutures of 0 vicryl. The subcutaneous tissue was reapproximated with 3-0 plain. The skin was reapproximated with insorb subcuticular staples and reinforced with skin glue.      Instrument, sponge, and needle counts were correct prior the abdominal closure and at the conclusion of the case.         Thai Lora MD  07:08 EST  3/6/2021

## 2021-03-06 PROBLEM — Z3A.33 33 WEEKS GESTATION OF PREGNANCY: Status: ACTIVE | Noted: 2021-03-06

## 2021-03-06 PROBLEM — O32.8XX0 FOOTLING BREECH PRESENTATION: Status: ACTIVE | Noted: 2021-03-06

## 2021-03-06 LAB
BASOPHILS # BLD AUTO: 0.03 10*3/MM3 (ref 0–0.2)
BASOPHILS NFR BLD AUTO: 0.3 % (ref 0–1.5)
DEPRECATED RDW RBC AUTO: 40.8 FL (ref 37–54)
EOSINOPHIL # BLD AUTO: 0.08 10*3/MM3 (ref 0–0.4)
EOSINOPHIL NFR BLD AUTO: 0.7 % (ref 0.3–6.2)
ERYTHROCYTE [DISTWIDTH] IN BLOOD BY AUTOMATED COUNT: 12.4 % (ref 12.3–15.4)
HCT VFR BLD AUTO: 31.3 % (ref 34–46.6)
HGB BLD-MCNC: 10.1 G/DL (ref 12–15.9)
IMM GRANULOCYTES # BLD AUTO: 0.12 10*3/MM3 (ref 0–0.05)
IMM GRANULOCYTES NFR BLD AUTO: 1 % (ref 0–0.5)
LYMPHOCYTES # BLD AUTO: 1.7 10*3/MM3 (ref 0.7–3.1)
LYMPHOCYTES NFR BLD AUTO: 14.3 % (ref 19.6–45.3)
MCH RBC QN AUTO: 29.4 PG (ref 26.6–33)
MCHC RBC AUTO-ENTMCNC: 32.3 G/DL (ref 31.5–35.7)
MCV RBC AUTO: 91.3 FL (ref 79–97)
MONOCYTES # BLD AUTO: 0.45 10*3/MM3 (ref 0.1–0.9)
MONOCYTES NFR BLD AUTO: 3.8 % (ref 5–12)
NEUTROPHILS NFR BLD AUTO: 79.9 % (ref 42.7–76)
NEUTROPHILS NFR BLD AUTO: 9.47 10*3/MM3 (ref 1.7–7)
NRBC BLD AUTO-RTO: 0 /100 WBC (ref 0–0.2)
PLATELET # BLD AUTO: 233 10*3/MM3 (ref 140–450)
PMV BLD AUTO: 10.3 FL (ref 6–12)
RBC # BLD AUTO: 3.43 10*6/MM3 (ref 3.77–5.28)
WBC # BLD AUTO: 11.85 10*3/MM3 (ref 3.4–10.8)

## 2021-03-06 PROCEDURE — 0503F POSTPARTUM CARE VISIT: CPT | Performed by: NURSE PRACTITIONER

## 2021-03-06 PROCEDURE — 25010000002 KETOROLAC TROMETHAMINE PER 15 MG: Performed by: STUDENT IN AN ORGANIZED HEALTH CARE EDUCATION/TRAINING PROGRAM

## 2021-03-06 PROCEDURE — 85025 COMPLETE CBC W/AUTO DIFF WBC: CPT | Performed by: STUDENT IN AN ORGANIZED HEALTH CARE EDUCATION/TRAINING PROGRAM

## 2021-03-06 RX ADMIN — SIMETHICONE 80 MG: 80 TABLET, CHEWABLE ORAL at 07:46

## 2021-03-06 RX ADMIN — KETOROLAC TROMETHAMINE 15 MG: 15 INJECTION, SOLUTION INTRAMUSCULAR; INTRAVENOUS at 11:51

## 2021-03-06 RX ADMIN — ACETAMINOPHEN 1000 MG: 500 TABLET ORAL at 02:50

## 2021-03-06 RX ADMIN — ACETAMINOPHEN 1000 MG: 500 TABLET ORAL at 14:52

## 2021-03-06 RX ADMIN — PRENATAL VITAMINS-IRON FUMARATE 27 MG IRON-FOLIC ACID 0.8 MG TABLET 1 TABLET: at 11:51

## 2021-03-06 RX ADMIN — ACETAMINOPHEN 1000 MG: 500 TABLET ORAL at 07:46

## 2021-03-06 RX ADMIN — ACETAMINOPHEN 650 MG: 325 TABLET ORAL at 20:01

## 2021-03-06 RX ADMIN — SIMETHICONE 80 MG: 80 TABLET, CHEWABLE ORAL at 20:01

## 2021-03-06 RX ADMIN — KETOROLAC TROMETHAMINE 15 MG: 15 INJECTION, SOLUTION INTRAMUSCULAR; INTRAVENOUS at 16:35

## 2021-03-06 RX ADMIN — IBUPROFEN 600 MG: 600 TABLET ORAL at 23:09

## 2021-03-06 RX ADMIN — KETOROLAC TROMETHAMINE 15 MG: 15 INJECTION, SOLUTION INTRAMUSCULAR; INTRAVENOUS at 05:36

## 2021-03-06 RX ADMIN — DOCUSATE SODIUM 100 MG: 100 CAPSULE, LIQUID FILLED ORAL at 07:46

## 2021-03-06 RX ADMIN — DOCUSATE SODIUM 100 MG: 100 CAPSULE, LIQUID FILLED ORAL at 20:01

## 2021-03-06 NOTE — PROGRESS NOTES
3/6/2021    Name:Gauri Hester    MR#:[id number]     PROGRESS NOTE:  Post-Op 1 S/P    HD:1    Subjective   25 y.o. yo Female  s/p CS at 33w2d doing well. Pain well controlled. Tolerating regular diet and having flatus. Lochia normal.     Patient Active Problem List   Diagnosis   • Melanoma in situ (CMS/HCC)   • 33 weeks gestation of pregnancy   • Premature rupture of membranes (PROM), onset of labor after 24 hours, antepartum, , third trimester   • Footling breech presentation   • Postpartum care following  delivery        Objective    Vitals  Temp:  Temp:  [97.6 °F (36.4 °C)-100.2 °F (37.9 °C)] 98.1 °F (36.7 °C)  Temp src: Oral  BP:  BP: ()/(35-89) 99/58  Pulse:  Heart Rate:  [] 76  RR:   Resp:  [16-18] 16    General Awake, alert, no distress  Abdomen Soft, non-distended, fundus firm, below umbilicus, appropriately tender  Incision  Intact, no erythema or exudate  Extremities Calves NT bilaterally     I/O last 3 completed shifts:  In: 900 [I.V.:900]  Out: 4550 [Urine:4050; Blood:500]    LABS:   Lab Results   Component Value Date    WBC 11.85 (H) 2021    HGB 10.1 (L) 2021    HCT 31.3 (L) 2021    MCV 91.3 2021     2021       Infant: male       Assessment   1.  POD 1, doing well; a.m. labs pending   2.  Baby boy stable in NICU    Plan:   Routine postoperative care      Active Problems:   None      Thai Lora MD  3/6/2021 13:15 EST

## 2021-03-06 NOTE — LACTATION NOTE
This note was copied from a baby's chart.     03/05/21 2045   Maternal Information   Person Making Referral other (see comments)  (Courtesy visit, Reports to RN she does not want to Pump/BF)

## 2021-03-06 NOTE — PROGRESS NOTES
[facility] [place]    3/6/2021    Name:Gauri Hester    MR#:[id number]     PROGRESS NOTE:  Post-Op 1 S/P    HD:1    Chief complaint  Postop day 1, no complaints    Subjective   25 y.o. yo Female  s/p CS at 33w2d doing well. Pain well controlled. Tolerating regular diet and having flatus. Lochia normal.     Patient Active Problem List   Diagnosis   • Melanoma in situ (CMS/HCC)   • 33 weeks gestation of pregnancy   • Premature rupture of membranes (PROM), onset of labor after 24 hours, antepartum, , third trimester   • Footling breech presentation   • Postpartum care following  delivery        Objective    Vitals  Temp:  Temp:  [97.6 °F (36.4 °C)-100.2 °F (37.9 °C)] 98.9 °F (37.2 °C)  Temp src: Oral  BP:  BP: ()/(35-89) 106/63  Pulse:  Heart Rate:  [] 84  RR:   Resp:  [16-18] 16    General Awake, alert, no distress  Abdomen Soft, nondistended, fundus firm, -2 below umbilicus, appropriately tender  Incision  Intact, no erythema or exudate  Extremities Calves NT bilaterally     I/O last 3 completed shifts:  In: 900 [I.V.:900]  Out: 4550 [Urine:4050; Blood:500]    LABS:   Lab Results   Component Value Date    WBC 13.12 (H) 2021    HGB 11.5 (L) 2021    HCT 34.5 2021    MCV 89.6 2021     2021       Infant: male       Assessment   1.  POD 1    Plan: Doing well.  Routine postoperative care      Active Problems:   None      Thai Lora MD  3/6/2021 07:59 EST

## 2021-03-07 PROCEDURE — 0503F POSTPARTUM CARE VISIT: CPT | Performed by: OBSTETRICS & GYNECOLOGY

## 2021-03-07 RX ADMIN — IBUPROFEN 600 MG: 600 TABLET ORAL at 12:41

## 2021-03-07 RX ADMIN — OXYCODONE 10 MG: 5 TABLET ORAL at 21:07

## 2021-03-07 RX ADMIN — OXYCODONE 5 MG: 5 TABLET ORAL at 01:59

## 2021-03-07 RX ADMIN — OXYCODONE 10 MG: 5 TABLET ORAL at 08:04

## 2021-03-07 RX ADMIN — ACETAMINOPHEN 650 MG: 325 TABLET ORAL at 21:07

## 2021-03-07 RX ADMIN — ACETAMINOPHEN 650 MG: 325 TABLET ORAL at 01:59

## 2021-03-07 RX ADMIN — ACETAMINOPHEN 650 MG: 325 TABLET ORAL at 15:50

## 2021-03-07 RX ADMIN — SIMETHICONE 80 MG: 80 TABLET, CHEWABLE ORAL at 08:04

## 2021-03-07 RX ADMIN — OXYCODONE 10 MG: 5 TABLET ORAL at 15:50

## 2021-03-07 RX ADMIN — IBUPROFEN 600 MG: 600 TABLET ORAL at 23:19

## 2021-03-07 RX ADMIN — IBUPROFEN 600 MG: 600 TABLET ORAL at 18:51

## 2021-03-07 RX ADMIN — DOCUSATE SODIUM 100 MG: 100 CAPSULE, LIQUID FILLED ORAL at 08:04

## 2021-03-07 RX ADMIN — ACETAMINOPHEN 650 MG: 325 TABLET ORAL at 08:04

## 2021-03-07 RX ADMIN — SIMETHICONE 80 MG: 80 TABLET, CHEWABLE ORAL at 18:51

## 2021-03-07 RX ADMIN — IBUPROFEN 600 MG: 600 TABLET ORAL at 05:13

## 2021-03-07 NOTE — LACTATION NOTE
03/07/21 1400   Maternal Information   Person Making Referral physician  (pt davin on Friday and declined to pump for NICU baby)   Maternal Reason for Referral   (today mom is thinking about pumping)   Infant Reason for Referral NICU admission   Answered mom's questions r/t pumping. Discussed advantages to baby, if getting colostrum or breast milk. Discussed pumping every 3 hours to get best milk supply possible,. Mom will think about this and will request pump and demonstration, if she decides to pump.

## 2021-03-07 NOTE — PROGRESS NOTES
[facility] [place]    3/7/2021    Name:Gauri Hester    MR#:[id number]     PROGRESS NOTE:  Post-Op 2 S/P    HD:2    Chief complaint  Postop day 2, no complaints    Subjective   25 y.o. yo Female  s/p CS at 33w2d doing well. Pain fairly well controlled. Tolerating regular diet and having flatus. Lochia normal.  Patient is not as comfortable since the Duramorph wore off.  She is increasing her OxyContin to 10 mg.    Patient Active Problem List   Diagnosis   • Melanoma in situ (CMS/HCC)   • 33 weeks gestation of pregnancy   • Premature rupture of membranes (PROM), onset of labor after 24 hours, antepartum, , third trimester   • Footling breech presentation   • Postpartum care following  delivery        Objective    Vitals  Temp:  Temp:  [97.7 °F (36.5 °C)-99 °F (37.2 °C)] 99 °F (37.2 °C)  Temp src: Oral  BP:  BP: ()/(55-61) 104/55  Pulse:  Heart Rate:  [76-96] 83  RR:   Resp:  [16] 16    General Awake, alert, no distress  Abdomen Soft, nondistended, fundus firm, -2 below umbilicus, appropriately tender  Incision  Intact, no erythema or exudate  Extremities Calves NT bilaterally     I/O last 3 completed shifts:  In: -   Out: 4350 [Urine:4350]    LABS:   Lab Results   Component Value Date    WBC 11.85 (H) 2021    HGB 10.1 (L) 2021    HCT 31.3 (L) 2021    MCV 91.3 2021     2021       Infant: male       Assessment   1.  POD 2    Plan: Doing well.  Routine postoperative care      Active Problems:   None      Thai Lora MD  3/7/2021 08:19 EST

## 2021-03-07 NOTE — PLAN OF CARE
Goal Outcome Evaluation:  Plan of Care Reviewed With: patient, spouse  Progress: improving  Outcome Summary: VSS, incision WDL, lochia small, uterus firm

## 2021-03-08 PROCEDURE — 0503F POSTPARTUM CARE VISIT: CPT | Performed by: OBSTETRICS & GYNECOLOGY

## 2021-03-08 RX ADMIN — PRENATAL VITAMINS-IRON FUMARATE 27 MG IRON-FOLIC ACID 0.8 MG TABLET 1 TABLET: at 09:30

## 2021-03-08 RX ADMIN — IBUPROFEN 600 MG: 600 TABLET ORAL at 23:46

## 2021-03-08 RX ADMIN — ACETAMINOPHEN 650 MG: 325 TABLET ORAL at 15:06

## 2021-03-08 RX ADMIN — SIMETHICONE 80 MG: 80 TABLET, CHEWABLE ORAL at 17:25

## 2021-03-08 RX ADMIN — ACETAMINOPHEN 650 MG: 325 TABLET ORAL at 21:11

## 2021-03-08 RX ADMIN — SIMETHICONE 80 MG: 80 TABLET, CHEWABLE ORAL at 12:24

## 2021-03-08 RX ADMIN — SIMETHICONE 80 MG: 80 TABLET, CHEWABLE ORAL at 09:30

## 2021-03-08 RX ADMIN — OXYCODONE 10 MG: 5 TABLET ORAL at 21:12

## 2021-03-08 RX ADMIN — IBUPROFEN 600 MG: 600 TABLET ORAL at 05:34

## 2021-03-08 RX ADMIN — IBUPROFEN 600 MG: 600 TABLET ORAL at 17:24

## 2021-03-08 RX ADMIN — ACETAMINOPHEN 650 MG: 325 TABLET ORAL at 09:30

## 2021-03-08 RX ADMIN — ACETAMINOPHEN 650 MG: 325 TABLET ORAL at 02:53

## 2021-03-08 RX ADMIN — Medication: at 21:00

## 2021-03-08 RX ADMIN — IBUPROFEN 600 MG: 600 TABLET ORAL at 12:24

## 2021-03-08 NOTE — PROGRESS NOTES
[facility] [place]    3/8/2021    Name:Gauri Hester    MR#:[id number]     PROGRESS NOTE:  Post-Op 3 S/P    HD:3    Chief complaint  Hospital day 3, no complaints    Subjective   25 y.o. yo Female  s/p CS at 33w2d doing well. Pain well controlled. Tolerating regular diet and having flatus. Lochia normal.     Patient Active Problem List   Diagnosis   • Melanoma in situ (CMS/HCC)   • 33 weeks gestation of pregnancy   • Premature rupture of membranes (PROM), onset of labor after 24 hours, antepartum, , third trimester   • Footling breech presentation   • Postpartum care following  delivery        Objective    Vitals  Temp:  Temp:  [97.9 °F (36.6 °C)-98.4 °F (36.9 °C)] 98 °F (36.7 °C)  Temp src: Oral  BP:  BP: (100-136)/(56-79) 136/79  Pulse:  Heart Rate:  [86-93] 86  RR:   Resp:  [16-18] 16    General Awake, alert, no distress  Abdomen Soft, nondistended, fundus firm, -2 below umbilicus, appropriately tender  Incision  Intact, no erythema or exudate  Extremities Calves NT bilaterally     No intake/output data recorded.    LABS:   Lab Results   Component Value Date    WBC 11.85 (H) 2021    HGB 10.1 (L) 2021    HCT 31.3 (L) 2021    MCV 91.3 2021     2021       Infant: male       Assessment   1.  POD 3    Plan: Doing well.  Routine postoperative care, home tomorrow      Active Problems:   None      Thai Lora MD  3/8/2021 10:05 EST

## 2021-03-08 NOTE — PLAN OF CARE
Problem: Breastfeeding  Goal: Effective Breastfeeding  Outcome: Ongoing, Progressing  Intervention: Promote Effective Breastfeeding  Flowsheets (Taken 3/7/2021 1945)  Breastfeeding Assistance: support offered  Parent/Child Attachment Promotion:   caring behavior modeled   participation in care promoted   skin-to-skin contact encouraged   positive reinforcement provided   strengths emphasized  Intervention: Support Exclusive Breastfeeding Success  Flowsheets (Taken 3/7/2021 1945)  Supportive Measures: active listening utilized  Breastfeeding Support:   diary/feeding log utilized   encouragement provided   lactation counseling provided   Goal Outcome Evaluation:

## 2021-03-08 NOTE — LACTATION NOTE
"P4P contract gone over and given to mom.  Encouraged mom to fill out pump Rx so she can get personal pump prior to discharge.  Mom has been pumping q 3 hours since yesterday and getting out \"small amounts\".  States breasts are avila and heavier today.  Encouraged breast massage and moist heat.  "

## 2021-03-08 NOTE — LACTATION NOTE
"   03/07/21 1945   Maternal Information   Person Making Referral nurse;patient  (Mom asking questions about pumping & willing to 'try')   Infant Reason for Referral NICU admission   Maternal Assessment   Breast Size Issue none   Breast Shape Bilateral:;round   Breast Density Right:;soft   Nipples Bilateral:;short   Milk Expression/Equipment   Breast Pump Type double electric, hospital grade;double electric, personal  (Initiated w/hospital pump; gave signed pump rx to fill out)   Breast Pump Flange Type hard   Breast Pump Flange Size 24 mm   Breast Pumping   Breast Pumping Interventions early pumping promoted;frequent pumping encouraged   Pumping initiated ~ 48 hours after delivery. Discussed possibility of not making full milk supply but encouraged to pump to get as much colostrum and milk as possible for baby in NICU. Gave microwave steam bags and instructed to sterilize pump parts every 24 hours while baby is in NICU. Encouraged to watch online videos by [employer], \"Maximizing Milk Production\" and \"Hand Expression\" that demonstrate how to use hands to help with milk expression. Encouraged to pump every 3 hours to get best milk supply possible. Mom return demonstrated pump use and will pump after she gets back from visiting baby in NICU. Teaching done as documented under Education. To call lactation services, if there are questions or concerns.   "

## 2021-03-09 VITALS
WEIGHT: 175 LBS | HEART RATE: 76 BPM | RESPIRATION RATE: 16 BRPM | DIASTOLIC BLOOD PRESSURE: 74 MMHG | OXYGEN SATURATION: 100 % | BODY MASS INDEX: 32.2 KG/M2 | HEIGHT: 62 IN | SYSTOLIC BLOOD PRESSURE: 117 MMHG | TEMPERATURE: 98.5 F

## 2021-03-09 PROCEDURE — 0503F POSTPARTUM CARE VISIT: CPT | Performed by: OBSTETRICS & GYNECOLOGY

## 2021-03-09 RX ORDER — DIPHENHYDRAMINE HCL 25 MG
25 CAPSULE ORAL EVERY 4 HOURS PRN
Qty: 50 CAPSULE | Refills: 0 | Status: SHIPPED | OUTPATIENT
Start: 2021-03-09

## 2021-03-09 RX ORDER — IBUPROFEN 600 MG/1
600 TABLET ORAL EVERY 6 HOURS
Qty: 30 TABLET | Refills: 3 | Status: SHIPPED | OUTPATIENT
Start: 2021-03-09

## 2021-03-09 RX ORDER — CEPHALEXIN 500 MG/1
500 CAPSULE ORAL EVERY 6 HOURS SCHEDULED
Qty: 39 CAPSULE | Refills: 0 | Status: SHIPPED | OUTPATIENT
Start: 2021-03-09 | End: 2021-03-19

## 2021-03-09 RX ORDER — CEPHALEXIN 250 MG/1
500 CAPSULE ORAL EVERY 6 HOURS SCHEDULED
Status: DISCONTINUED | OUTPATIENT
Start: 2021-03-09 | End: 2021-03-09 | Stop reason: HOSPADM

## 2021-03-09 RX ORDER — OXYCODONE HYDROCHLORIDE 5 MG/1
5 TABLET ORAL EVERY 4 HOURS PRN
Qty: 20 TABLET | Refills: 0 | Status: SHIPPED | OUTPATIENT
Start: 2021-03-09

## 2021-03-09 RX ORDER — OXYCODONE HYDROCHLORIDE 5 MG/1
10 TABLET ORAL EVERY 4 HOURS PRN
Status: DISCONTINUED | OUTPATIENT
Start: 2021-03-09 | End: 2021-03-09 | Stop reason: HOSPADM

## 2021-03-09 RX ORDER — OXYCODONE HYDROCHLORIDE 5 MG/1
5 TABLET ORAL EVERY 4 HOURS PRN
Status: DISCONTINUED | OUTPATIENT
Start: 2021-03-09 | End: 2021-03-09 | Stop reason: HOSPADM

## 2021-03-09 RX ORDER — PSEUDOEPHEDRINE HCL 30 MG
100 TABLET ORAL 2 TIMES DAILY PRN
Qty: 60 CAPSULE | Refills: 3 | Status: SHIPPED | OUTPATIENT
Start: 2021-03-09

## 2021-03-09 RX ADMIN — PRENATAL VITAMINS-IRON FUMARATE 27 MG IRON-FOLIC ACID 0.8 MG TABLET 1 TABLET: at 09:09

## 2021-03-09 RX ADMIN — DOCUSATE SODIUM 100 MG: 100 CAPSULE, LIQUID FILLED ORAL at 09:09

## 2021-03-09 RX ADMIN — IBUPROFEN 600 MG: 600 TABLET ORAL at 05:57

## 2021-03-09 RX ADMIN — ACETAMINOPHEN 650 MG: 325 TABLET ORAL at 09:08

## 2021-03-09 RX ADMIN — CEPHALEXIN 500 MG: 250 CAPSULE ORAL at 05:56

## 2021-03-09 RX ADMIN — ACETAMINOPHEN 650 MG: 325 TABLET ORAL at 01:45

## 2021-03-09 RX ADMIN — SIMETHICONE 80 MG: 80 TABLET, CHEWABLE ORAL at 09:09

## 2021-03-09 RX ADMIN — OXYCODONE 10 MG: 5 TABLET ORAL at 02:06

## 2021-03-09 NOTE — LACTATION NOTE
Mom states she decided during the night that she no longer wishes to pump expressed milk for NICU baby and would like to formula feed.  Educated mom on how to dry up milk supply.

## 2021-03-09 NOTE — PLAN OF CARE
Goal Outcome Evaluation:  VSS. U/1, firm, light bleeding. Pain controlled with meds. Visiting with infant in NICU. Ready for D/C.

## 2021-03-09 NOTE — PROGRESS NOTES
Called to see Gauri due to increased bilateral breast pain.   She has carolyn pumping every 2 hours since her infant is in the NICU.   She feels overwhelmed and at this point has no interest on continuing to breast feed.  She denies N/V/F/C.    Exam:   Breast have red striations and some indurations on the outer quadrants.   No mass or obvious abscess.    Plan:  1. Keflex 500 mg 4 times daily x 10 days.  2. Encourage warm compress   3. Breast binder given that she no longer want to breast feed

## 2021-03-09 NOTE — DISCHARGE SUMMARY
[facility] [place]   section Discharge Summary    Date of Admission: 3/5/2021  Date of Discharge:  3/9/2021      Patient: Gauri Hester      MR#:[id number]    Surgeon/OB: Thai Lora MD    Discharge Diagnosis:  Intrauterine pregnancy 33 weeks  Premature rupture of membranes  Active labor  Footling breech presentation    Procedures:  , Low Transverse     3/5/2021    1:39 PM      Anesthesia:  Spinal     Presenting Problem/History of Present Illness   PROM w/onset labor within 24 hours rupture in 3rd trimester [O42.013]     Patient Active Problem List   Diagnosis   • Melanoma in situ (CMS/HCC)   • 33 weeks gestation of pregnancy   • Premature rupture of membranes (PROM), onset of labor after 24 hours, antepartum, , third trimester   • Footling breech presentation   • Postpartum care following  delivery   • Mastitis associated with childbirth, delivered       Hospital Course  Patient is a 25 y.o. female  at 33w2d status post  section with uneventful postoperative recovery.  Patient was advanced to regular diet on postoperative day#1.  On discharge, ambulating, tolerating a regular diet without any difficulties and her incision is dry, clean and intact.  Patient has decided to bottlefeed.  She was evaluated by the laborist last p.m. and treated with Keflex for mastitis.  She will continue the Keflex on discharge.  She will discuss birth control with her 2-week checkup.    Infant:   male  fetus 2280 g (5 lb 0.4 oz)  with Apgar scores of 7 , 9  at five minutes.    Condition on Discharge:  Stable    Vital Signs  Temp:  [97.9 °F (36.6 °C)-98.7 °F (37.1 °C)] 98.5 °F (36.9 °C)  Heart Rate:  [76-86] 76  Resp:  [16-18] 16  BP: (107-117)/(63-74) 117/74    Lab Results   Component Value Date    WBC 11.85 (H) 2021    HGB 10.1 (L) 2021    HCT 31.3 (L) 2021    MCV 91.3 2021     2021      Patient is A+, rubella immune, and hepatitis B, C nonreactive    Discharge Disposition  Home or Self Care    Discharge Medications     Discharge Medications      New Medications      Instructions Start Date   cephalexin 500 MG capsule  Commonly known as: KEFLEX   500 mg, Oral, Every 6 Hours Scheduled      diphenhydrAMINE 25 mg capsule  Commonly known as: BENADRYL   25 mg, Oral, Every 4 Hours PRN      ibuprofen 600 MG tablet  Commonly known as: ADVIL,MOTRIN   600 mg, Oral, Every 6 Hours      oxyCODONE 5 MG immediate release tablet  Commonly known as: ROXICODONE   5 mg, Oral, Every 4 Hours PRN         Changes to Medications      Instructions Start Date   docusate sodium 100 MG capsule  What changed:   · when to take this  · reasons to take this   100 mg, Oral, 2 Times Daily PRN         Continue These Medications      Instructions Start Date   acetaminophen 500 MG tablet  Commonly known as: TYLENOL   500 mg, Oral, Every 6 Hours PRN      PRENATAL VITAMIN PO   Oral         Stop These Medications    calcium carbonate 500 MG chewable tablet  Commonly known as: TUMS     ondansetron 4 MG tablet  Commonly known as: Zofran     promethazine 25 MG tablet  Commonly known as: PHENERGAN            Discharge Diet:   Diet Instructions     Advance Diet As Tolerated            Activity at Discharge:   Activity Instructions     Activity as Tolerated      Bathing Restrictions      Type of Restriction: Bathing    Bathing Restrictions: No Tub Bath    Driving Restrictions      Type of Restriction: Driving    Driving Restrictions: No Driving Until Next Appointment    Housework Restrictions      Type of Restriction: Housework    Explain Housework Restrictions: Patient may do light housework    Lifting Restrictions      Type of Restriction: Lifting    Lifting Restrictions: Lifting Restriction (Indicate Limit)    Weight Limit (Pounds): 15    Length of Lifting Restriction: No heavy lifting for 4 weeks    Sexual Activity Restrictions       Type of Restriction: Sex    Explain Sexual Activity Restrictions: No intercourse for 4 weeks    Work Restrictions      Type of Restriction: Work    May Return to Work: Other    Return to Work Instructions: Patient may return to work and 6 to 8 weeks          Follow-up Appointments  Future Appointments   Date Time Provider Department Center   3/15/2021 11:20 AM Thai Lora MD [facility]       Thai GAXIOLA. MD [facility]    Additional Instructions for the Follow-ups that You Need to Schedule     Discharge Follow-up with Specified Provider: Dr. Lora; 2 Weeks   As directed      To: Dr. Lora    Follow Up: 2 Weeks

## 2021-03-15 ENCOUNTER — ROUTINE PRENATAL (OUTPATIENT)
Dept: OBSTETRICS AND GYNECOLOGY | Facility: CLINIC | Age: 26
End: 2021-03-15

## 2021-03-15 ENCOUNTER — POSTPARTUM VISIT (OUTPATIENT)
Dept: OBSTETRICS AND GYNECOLOGY | Facility: CLINIC | Age: 26
End: 2021-03-15

## 2021-03-15 VITALS
DIASTOLIC BLOOD PRESSURE: 62 MMHG | WEIGHT: 160.6 LBS | SYSTOLIC BLOOD PRESSURE: 100 MMHG | HEIGHT: 62 IN | RESPIRATION RATE: 14 BRPM | BODY MASS INDEX: 29.55 KG/M2

## 2021-03-15 DIAGNOSIS — Z30.09 COUNSELING FOR BIRTH CONTROL, ORAL CONTRACEPTIVES: ICD-10-CM

## 2021-03-15 DIAGNOSIS — D03.8 MELANOMA IN SITU OF OTHER SITE (HCC): ICD-10-CM

## 2021-03-15 DIAGNOSIS — Z34.03 ENCOUNTER FOR SUPERVISION OF NORMAL FIRST PREGNANCY IN THIRD TRIMESTER: Primary | ICD-10-CM

## 2021-03-15 PROCEDURE — 0503F POSTPARTUM CARE VISIT: CPT | Performed by: OBSTETRICS & GYNECOLOGY

## 2021-03-15 RX ORDER — NORETHINDRONE ACETATE AND ETHINYL ESTRADIOL 1MG-20(21)
1 KIT ORAL DAILY
Qty: 28 TABLET | Refills: 12 | Status: SHIPPED | OUTPATIENT
Start: 2021-03-15 | End: 2022-03-15

## 2021-03-15 NOTE — PROGRESS NOTES
Subjective   Gauri Hester is a 25 y.o. female is here today for follow-up.    Chief Complaint   Patient presents with   • Postpartum Care     No complaints        History of Present Illness  Patient is now 2 weeks post primary  section at 33 weeks.  Baby is doing extremely well and may go home in a couple days.  Patient has done well.  She is bottlefeeding this is going well well.  Her breast milk is starting to dry.  She is having no postsurgical problems.  She has no postpartum depression.  She wants to start oral contraceptives for birth control.  Patient will return in 4 weeks  The following portions of the patient's history were reviewed and updated as appropriate: allergies, current medications, past family history, past medical history, past social history, past surgical history and problem list.    Review of Systems - History obtained from the patient  General ROS: positive for  - weight loss  Psychological ROS: negative  ENT ROS: negative  Allergy and Immunology ROS: negative  Hematological and Lymphatic ROS: negative  Endocrine ROS: negative  Breast ROS: negative for breast lumps  Respiratory ROS: no cough, shortness of breath, or wheezing  Cardiovascular ROS: no chest pain or dyspnea on exertion  Gastrointestinal ROS: no abdominal pain, change in bowel habits, or black or bloody stools  Genito-Urinary ROS: no dysuria, trouble voiding, or hematuria  Musculoskeletal ROS: negative  Neurological ROS: no TIA or stroke symptoms  Dermatological ROS: negative     Objective   General:  well developed; well nourished  no acute distress   Skin:  Not performed.   Thyroid: not examined   Breasts:  Not performed.   Abdomen: soft, non-tender; no masses  no umbilical or inguinal hernias are present  no hepato-splenomegaly  incision is clean, dry, intact and without drainage   Heart: regular rate and rhythm, S1, S2 normal, no murmur, click, rub or gallop   Lungs: clear to auscultation   Pelvis: Not performed.          Assessment/Plan   Diagnoses and all orders for this visit:    1. Postpartum care following  delivery (Primary)    2. Counseling for birth control, oral contraceptives  -     norethindrone-ethinyl estradiol FE (Junel FE 1/20) 1-20 MG-MCG per tablet; Take 1 tablet by mouth Daily.  Dispense: 28 tablet; Refill: 12    3. Melanoma in situ of other site (CMS/HCC)      Return in 4 weeks    Thai Lora MD

## 2021-04-14 ENCOUNTER — POSTPARTUM VISIT (OUTPATIENT)
Dept: OBSTETRICS AND GYNECOLOGY | Facility: CLINIC | Age: 26
End: 2021-04-14

## 2021-04-14 VITALS
BODY MASS INDEX: 28.45 KG/M2 | DIASTOLIC BLOOD PRESSURE: 62 MMHG | HEIGHT: 62 IN | RESPIRATION RATE: 14 BRPM | WEIGHT: 154.6 LBS | SYSTOLIC BLOOD PRESSURE: 102 MMHG

## 2021-04-14 DIAGNOSIS — Z30.41 VISIT FOR BIRTH CONTROL PILLS MAINTENANCE: ICD-10-CM

## 2021-04-14 PROCEDURE — 0503F POSTPARTUM CARE VISIT: CPT | Performed by: OBSTETRICS & GYNECOLOGY

## 2021-04-14 NOTE — PROGRESS NOTES
Subjective   Gauri Hester is a 25 y.o. female is here today for follow-up.    Chief Complaint   Patient presents with   • Postpartum Care     No complaints        History of Present Illness  Patient is 6 weeks post primary  section.  She is having no postpartum problems.  She is using oral contraceptives for birth control and this is working very well.  She is bottlefeeding and the baby is doing well.  She has no postpartum depression  The following portions of the patient's history were reviewed and updated as appropriate: allergies, current medications, past family history, past medical history, past social history, past surgical history and problem list.    Review of Systems - History obtained from the patient  General ROS: negative  Psychological ROS: negative  ENT ROS: negative  Allergy and Immunology ROS: negative  Hematological and Lymphatic ROS: negative  Endocrine ROS: negative  Breast ROS: negative for breast lumps  Respiratory ROS: no cough, shortness of breath, or wheezing  Cardiovascular ROS: no chest pain or dyspnea on exertion  Gastrointestinal ROS: positive for - constipation  Genito-Urinary ROS: no dysuria, trouble voiding, or hematuria  Musculoskeletal ROS: negative  Neurological ROS: no TIA or stroke symptoms  Dermatological ROS: negative     Objective   General:  well developed; well nourished  no acute distress   Skin:  No suspicious lesions seen   Thyroid: not examined   Breasts:  Not performed.   Abdomen: soft, non-tender; no masses  no umbilical or inguinal hernias are present  no hepato-splenomegaly  incision is clean, dry, intact and without drainage   Heart: regular rate and rhythm, S1, S2 normal, no murmur, click, rub or gallop   Lungs: clear to auscultation   Pelvis: Not performed.         Assessment/Plan   Diagnoses and all orders for this visit:    1. Postpartum care following  delivery (Primary)    2. Visit for birth control pills maintenance      Return in 1  year    Thai Lora MD

## 2025-03-19 ENCOUNTER — APPOINTMENT (OUTPATIENT)
Dept: ULTRASOUND IMAGING | Facility: HOSPITAL | Age: 30
End: 2025-03-19
Payer: COMMERCIAL

## 2025-03-19 ENCOUNTER — HOSPITAL ENCOUNTER (EMERGENCY)
Facility: HOSPITAL | Age: 30
Discharge: HOME | End: 2025-03-19
Attending: EMERGENCY MEDICINE
Payer: COMMERCIAL

## 2025-03-19 ENCOUNTER — TELEPHONE (OUTPATIENT)
Dept: OBSTETRICS AND GYNECOLOGY | Facility: CLINIC | Age: 30
End: 2025-03-19
Payer: COMMERCIAL

## 2025-03-19 VITALS
HEART RATE: 90 BPM | BODY MASS INDEX: 25.58 KG/M2 | WEIGHT: 139 LBS | TEMPERATURE: 98.3 F | RESPIRATION RATE: 16 BRPM | DIASTOLIC BLOOD PRESSURE: 75 MMHG | SYSTOLIC BLOOD PRESSURE: 110 MMHG | OXYGEN SATURATION: 100 % | HEIGHT: 62 IN

## 2025-03-19 DIAGNOSIS — Z3A.08 8 WEEKS GESTATION OF PREGNANCY: ICD-10-CM

## 2025-03-19 DIAGNOSIS — E86.0 ACUTE DEHYDRATION: ICD-10-CM

## 2025-03-19 DIAGNOSIS — O21.9 NAUSEA AND VOMITING DURING PREGNANCY: Primary | ICD-10-CM

## 2025-03-19 LAB
ALBUMIN SERPL-MCNC: 4.9 G/DL (ref 3.5–5.2)
ALBUMIN/GLOB SERPL: 2 G/DL
ALP SERPL-CCNC: 56 U/L (ref 39–117)
ALT SERPL W P-5'-P-CCNC: 30 U/L (ref 1–33)
ANION GAP SERPL CALCULATED.3IONS-SCNC: 14 MMOL/L (ref 5–15)
AST SERPL-CCNC: 29 U/L (ref 1–32)
BACTERIA UR QL AUTO: ABNORMAL /HPF
BASOPHILS # BLD AUTO: 0.02 10*3/MM3 (ref 0–0.2)
BASOPHILS NFR BLD AUTO: 0.2 % (ref 0–1.5)
BILIRUB SERPL-MCNC: 0.4 MG/DL (ref 0–1.2)
BILIRUB UR QL STRIP: NEGATIVE
BUN SERPL-MCNC: 8 MG/DL (ref 6–20)
BUN/CREAT SERPL: 13.6 (ref 7–25)
CALCIUM SPEC-SCNC: 9.6 MG/DL (ref 8.6–10.5)
CHLORIDE SERPL-SCNC: 103 MMOL/L (ref 98–107)
CLARITY UR: ABNORMAL
CO2 SERPL-SCNC: 23 MMOL/L (ref 22–29)
COLOR UR: YELLOW
CREAT SERPL-MCNC: 0.59 MG/DL (ref 0.57–1)
DEPRECATED RDW RBC AUTO: 38.1 FL (ref 37–54)
EGFRCR SERPLBLD CKD-EPI 2021: 124.5 ML/MIN/1.73
EOSINOPHIL # BLD AUTO: 0.02 10*3/MM3 (ref 0–0.4)
EOSINOPHIL NFR BLD AUTO: 0.2 % (ref 0.3–6.2)
ERYTHROCYTE [DISTWIDTH] IN BLOOD BY AUTOMATED COUNT: 12.3 % (ref 12.3–15.4)
FLUAV RNA RESP QL NAA+PROBE: NOT DETECTED
FLUBV RNA RESP QL NAA+PROBE: NOT DETECTED
GLOBULIN UR ELPH-MCNC: 2.5 GM/DL
GLUCOSE SERPL-MCNC: 86 MG/DL (ref 65–99)
GLUCOSE UR STRIP-MCNC: NEGATIVE MG/DL
HCT VFR BLD AUTO: 38.4 % (ref 34–46.6)
HGB BLD-MCNC: 13.2 G/DL (ref 12–15.9)
HGB UR QL STRIP.AUTO: NEGATIVE
HOLD SPECIMEN: NORMAL
HYALINE CASTS UR QL AUTO: ABNORMAL /LPF
IMM GRANULOCYTES # BLD AUTO: 0.04 10*3/MM3 (ref 0–0.05)
IMM GRANULOCYTES NFR BLD AUTO: 0.4 % (ref 0–0.5)
KETONES UR QL STRIP: ABNORMAL
LEUKOCYTE ESTERASE UR QL STRIP.AUTO: ABNORMAL
LYMPHOCYTES # BLD AUTO: 1.42 10*3/MM3 (ref 0.7–3.1)
LYMPHOCYTES NFR BLD AUTO: 15.3 % (ref 19.6–45.3)
MCH RBC QN AUTO: 29.4 PG (ref 26.6–33)
MCHC RBC AUTO-ENTMCNC: 34.4 G/DL (ref 31.5–35.7)
MCV RBC AUTO: 85.5 FL (ref 79–97)
MONOCYTES # BLD AUTO: 0.45 10*3/MM3 (ref 0.1–0.9)
MONOCYTES NFR BLD AUTO: 4.8 % (ref 5–12)
NEUTROPHILS NFR BLD AUTO: 7.34 10*3/MM3 (ref 1.7–7)
NEUTROPHILS NFR BLD AUTO: 79.1 % (ref 42.7–76)
NITRITE UR QL STRIP: NEGATIVE
NRBC BLD AUTO-RTO: 0 /100 WBC (ref 0–0.2)
PH UR STRIP.AUTO: 6.5 [PH] (ref 5–8)
PLATELET # BLD AUTO: 266 10*3/MM3 (ref 140–450)
PMV BLD AUTO: 10.8 FL (ref 6–12)
POTASSIUM SERPL-SCNC: 4 MMOL/L (ref 3.5–5.2)
PROT SERPL-MCNC: 7.4 G/DL (ref 6–8.5)
PROT UR QL STRIP: ABNORMAL
RBC # BLD AUTO: 4.49 10*6/MM3 (ref 3.77–5.28)
RBC # UR STRIP: ABNORMAL /HPF
REF LAB TEST METHOD: ABNORMAL
SARS-COV-2 RNA RESP QL NAA+PROBE: NOT DETECTED
SODIUM SERPL-SCNC: 140 MMOL/L (ref 136–145)
SP GR UR STRIP: 1.03 (ref 1–1.03)
SQUAMOUS #/AREA URNS HPF: ABNORMAL /HPF
UROBILINOGEN UR QL STRIP: ABNORMAL
WBC # UR STRIP: ABNORMAL /HPF
WBC NRBC COR # BLD AUTO: 9.29 10*3/MM3 (ref 3.4–10.8)
WHOLE BLOOD HOLD COAG: NORMAL
WHOLE BLOOD HOLD SPECIMEN: NORMAL

## 2025-03-19 PROCEDURE — 87086 URINE CULTURE/COLONY COUNT: CPT | Performed by: EMERGENCY MEDICINE

## 2025-03-19 PROCEDURE — 87636 SARSCOV2 & INF A&B AMP PRB: CPT | Performed by: EMERGENCY MEDICINE

## 2025-03-19 PROCEDURE — 85025 COMPLETE CBC W/AUTO DIFF WBC: CPT | Performed by: EMERGENCY MEDICINE

## 2025-03-19 PROCEDURE — 81001 URINALYSIS AUTO W/SCOPE: CPT | Performed by: EMERGENCY MEDICINE

## 2025-03-19 PROCEDURE — 76817 TRANSVAGINAL US OBSTETRIC: CPT

## 2025-03-19 PROCEDURE — 80053 COMPREHEN METABOLIC PANEL: CPT | Performed by: EMERGENCY MEDICINE

## 2025-03-19 PROCEDURE — 25810000003 SODIUM CHLORIDE 0.9 % SOLUTION: Performed by: EMERGENCY MEDICINE

## 2025-03-19 PROCEDURE — 99284 EMERGENCY DEPT VISIT MOD MDM: CPT

## 2025-03-19 RX ORDER — SODIUM CHLORIDE 0.9 % (FLUSH) 0.9 %
10 SYRINGE (ML) INJECTION AS NEEDED
Status: DISCONTINUED | OUTPATIENT
Start: 2025-03-19 | End: 2025-03-19 | Stop reason: HOSPADM

## 2025-03-19 RX ORDER — LANOLIN ALCOHOL/MO/W.PET/CERES
25 CREAM (GRAM) TOPICAL ONCE
Status: COMPLETED | OUTPATIENT
Start: 2025-03-19 | End: 2025-03-19

## 2025-03-19 RX ADMIN — SODIUM CHLORIDE 1000 ML: 9 INJECTION, SOLUTION INTRAVENOUS at 19:24

## 2025-03-19 RX ADMIN — DOXYLAMINE SUCCINATE 12.5 MG: 25 TABLET ORAL at 20:34

## 2025-03-19 RX ADMIN — PYRIDOXINE HCL TAB 50 MG 25 MG: 50 TAB at 20:33

## 2025-03-19 NOTE — ED PROVIDER NOTES
Subjective   History of Present Illness  29-year-old female who states that she is currently 8 weeks pregnant based off last menstrual period who presents with a complaint of nausea vomiting and crampy abdominal pain.  She reports that she had the same thing her previous pregnancy and it persisted through 20 weeks.  Her previous pregnancy did have some complications at delivery but no significant complications through the pregnancy course of cell.  She does not have any nausea medication available at home.  She has been taking a combination of vitamin B6 and Unisom without relief.  No dysuria.  No diarrhea.  No definitive sick contacts.  No chest pain cough or shortness of breath.  Previous abdominal surgery includes .  She has been passing gas normally.  No other acute complaints.  She previously follow-up with Dr. Galaviz.  She is scheduled to follow-up with Dr. Cleveland Raymundo but has not seen him yet      Review of Systems   Constitutional:  Negative for chills, fatigue and fever.   HENT:  Negative for congestion, ear pain, postnasal drip, sinus pressure and sore throat.    Eyes:  Negative for pain, redness and visual disturbance.   Respiratory:  Negative for cough, chest tightness and shortness of breath.    Cardiovascular:  Negative for chest pain, palpitations and leg swelling.   Gastrointestinal:  Positive for abdominal pain, nausea and vomiting. Negative for anal bleeding, blood in stool and diarrhea.   Endocrine: Negative for polydipsia and polyuria.   Genitourinary:  Negative for difficulty urinating, dysuria, frequency and urgency.   Musculoskeletal:  Negative for arthralgias, back pain and neck pain.   Skin:  Negative for pallor and rash.   Allergic/Immunologic: Negative for environmental allergies and immunocompromised state.   Neurological:  Negative for dizziness, weakness and headaches.   Hematological:  Negative for adenopathy.   Psychiatric/Behavioral:  Negative for confusion, self-injury and  suicidal ideas. The patient is not nervous/anxious.    All other systems reviewed and are negative.      Past Medical History:   Diagnosis Date    Hemorrhoids during pregnancy in third trimester     Melanoma in situ 2019    right labia       No Known Allergies    Past Surgical History:   Procedure Laterality Date     SECTION N/A 3/5/2021    Procedure:  SECTION PRIMARY;  Surgeon: Thai Lora MD;  Location: [facility] LABOR DELIVERY;  Service: Obstetrics/Gynecology;  Laterality: N/A;    VULVA SURGERY Right     excision RT vulva of melanoma in situ       Family History   Problem Relation Age of Onset    Diabetes Father     Breast cancer Paternal Grandmother     No Known Problems Mother     No Known Problems Sister     No Known Problems Brother     No Known Problems Maternal Grandmother     No Known Problems Maternal Grandfather     No Known Problems Paternal Grandfather        Social History     Socioeconomic History    Marital status:     Number of children: 0   Tobacco Use    Smoking status: Never    Smokeless tobacco: Never   Vaping Use    Vaping status: Never Used   Substance and Sexual Activity    Alcohol use: Not Currently     Comment: occasional use when not pregnant    Drug use: Never    Sexual activity: Yes     Partners: Male     Birth control/protection: Pill, OCP           Objective   Physical Exam  Vitals and nursing note reviewed.   Constitutional:       General: She is not in acute distress.     Appearance: Normal appearance. She is well-developed. She is not toxic-appearing or diaphoretic.   HENT:      Head: Normocephalic and atraumatic.      Right Ear: External ear normal.      Left Ear: External ear normal.      Nose: Nose normal.   Eyes:      General: Lids are normal.      Pupils: Pupils are equal, round, and reactive to light.   Neck:      Trachea: No tracheal deviation.   Cardiovascular:      Rate and Rhythm: Normal rate and regular rhythm.      Pulses: No decreased  pulses.      Heart sounds: Normal heart sounds. No murmur heard.     No friction rub. No gallop.   Pulmonary:      Effort: Pulmonary effort is normal. No respiratory distress.      Breath sounds: Normal breath sounds. No decreased breath sounds, wheezing, rhonchi or rales.   Abdominal:      General: Bowel sounds are normal.      Palpations: Abdomen is soft.      Tenderness: There is no abdominal tenderness in the suprapubic area. There is no guarding or rebound.      Comments: Abdomen is soft, mild tenderness in the suprapubic region   Musculoskeletal:         General: No deformity. Normal range of motion.      Cervical back: Normal range of motion and neck supple.   Lymphadenopathy:      Cervical: No cervical adenopathy.   Skin:     General: Skin is warm and dry.      Findings: No rash.   Neurological:      Mental Status: She is alert and oriented to person, place, and time.      Cranial Nerves: No cranial nerve deficit.      Sensory: No sensory deficit.   Psychiatric:         Speech: Speech normal.         Behavior: Behavior normal.         Thought Content: Thought content normal.         Judgment: Judgment normal.         Procedures           ED Course                                                       Medical Decision Making  Differential includes dehydration, renal insufficiency, electrolyte abnormality, miscarriage, urinary tract infection, viral illness, other unspecified etiology.    COVID and flu test are negative.    Normal kidney function electrolytes.  Normal white count, normal H&H.    Urine shows trace ketones concerning for mild dehydration, trace bacteria but a significant amount of squamous epithelial cells given the concern for contaminant specimen.  The patient denies dysuria.    The patient received IV fluids and doxylamine and vitamin B6 combination to help with nausea here in the ER.    OB transvaginal ultrasound shows single intrauterine pregnancy with estimated gestational age of 8 weeks  and 6 days.  With normal heart rate of 169.    The patient appeared better on repeat evaluation.    She will be advised to drink plenty of fluids, and keep outpatient follow-up with her obstetrician, Dr. Cleveland Raymundo.    Problems Addressed:  8 weeks gestation of pregnancy: complicated acute illness or injury with systemic symptoms  Acute dehydration: complicated acute illness or injury with systemic symptoms  Nausea and vomiting during pregnancy: complicated acute illness or injury with systemic symptoms    Amount and/or Complexity of Data Reviewed  Independent Historian: spouse     Details:  provides additional information.  External Data Reviewed: labs and radiology.  Labs: ordered. Decision-making details documented in ED Course.  Radiology: ordered and independent interpretation performed. Decision-making details documented in ED Course.    Risk  OTC drugs.  Prescription drug management.        Final diagnoses:   Nausea and vomiting during pregnancy   8 weeks gestation of pregnancy   Acute dehydration       ED Disposition  ED Disposition       ED Disposition   Discharge    Condition   Stable    Comment   --               Sandee Rodriguez MD  [address] [place] [id number]  [phone]    In 1 week           Medication List      No changes were made to your prescriptions during this visit.            Adrianna Jeffrey MD  03/20/25 [id number]

## 2025-03-19 NOTE — TELEPHONE ENCOUNTER
return the call regarding her his wife she has been having severe vomiting throwing up about 15 times daily.  This has been going for a while but worse in the last 4-5 days.  She feel faint, fatigue, cramping and having dizziness also.  She can not take care of her 4 year old child.  LMP 2025  NOB appointment on 3/25   was advised to bring patient to [facility] ED

## 2025-03-19 NOTE — TELEPHONE ENCOUNTER
Upcoming matt pt:      Patient's  called, stating that patient is dizzy, throwing up 15 times a day and is not able to keep anything down.  states he is concerned and wants to know if there is anything that they can do.

## 2025-03-19 NOTE — Clinical Note
[facility] EMERGENCY DEPARTMENT  [address]  [facility] [address]  Phone: [phone]    Gauri Hester was seen and treated in our emergency department on 3/19/2025.  She may return to work on 03/22/2025.         Thank you for choosing [facility].    Adrianna Jeffrey MD

## 2025-03-20 NOTE — DISCHARGE INSTRUCTIONS
Continue to take a combination of Unisom and vitamin B6 to help with nausea.    Make sure to drink plenty of fluids.    Keep outpatient follow-up with your obstetrician, Dr. Raymundo as scheduled.

## 2025-03-21 LAB — BACTERIA SPEC AEROBE CULT: NO GROWTH

## 2025-03-25 ENCOUNTER — PREP FOR SURGERY (OUTPATIENT)
Dept: OTHER | Facility: HOSPITAL | Age: 30
End: 2025-03-25
Payer: COMMERCIAL

## 2025-03-25 ENCOUNTER — INITIAL PRENATAL (OUTPATIENT)
Dept: OBSTETRICS AND GYNECOLOGY | Facility: CLINIC | Age: 30
End: 2025-03-25
Payer: COMMERCIAL

## 2025-03-25 ENCOUNTER — HOSPITAL ENCOUNTER (OUTPATIENT)
Facility: HOSPITAL | Age: 30
Discharge: HOME | End: 2025-03-26
Attending: OBSTETRICS & GYNECOLOGY | Admitting: OBSTETRICS & GYNECOLOGY
Payer: COMMERCIAL

## 2025-03-25 VITALS — BODY MASS INDEX: 24.87 KG/M2 | DIASTOLIC BLOOD PRESSURE: 60 MMHG | WEIGHT: 136 LBS | SYSTOLIC BLOOD PRESSURE: 102 MMHG

## 2025-03-25 DIAGNOSIS — O09.90 SUPERVISION OF HIGH RISK PREGNANCY, ANTEPARTUM: ICD-10-CM

## 2025-03-25 DIAGNOSIS — F32.3 CURRENT SEVERE EPISODE OF MAJOR DEPRESSIVE DISORDER WITH PSYCHOTIC FEATURES WITHOUT PRIOR EPISODE: ICD-10-CM

## 2025-03-25 DIAGNOSIS — O21.1 HYPEREMESIS GRAVIDARUM WITH DEHYDRATION: ICD-10-CM

## 2025-03-25 DIAGNOSIS — O09.90 SUPERVISION OF HIGH RISK PREGNANCY, ANTEPARTUM: Primary | ICD-10-CM

## 2025-03-25 DIAGNOSIS — O21.0 HYPEREMESIS GRAVIDARUM: ICD-10-CM

## 2025-03-25 DIAGNOSIS — Z98.891 HISTORY OF CESAREAN SECTION: ICD-10-CM

## 2025-03-25 DIAGNOSIS — O21.0 HYPEREMESIS GRAVIDARUM: Primary | ICD-10-CM

## 2025-03-25 DIAGNOSIS — Z87.51 HISTORY OF PRETERM DELIVERY: ICD-10-CM

## 2025-03-25 DIAGNOSIS — Z30.2 REQUEST FOR STERILIZATION: ICD-10-CM

## 2025-03-25 DIAGNOSIS — Z12.4 CERVICAL CANCER SCREENING: ICD-10-CM

## 2025-03-25 PROBLEM — O32.8XX0 FOOTLING BREECH PRESENTATION: Status: RESOLVED | Noted: 2021-03-06 | Resolved: 2025-03-25

## 2025-03-25 PROBLEM — Z3A.33 33 WEEKS GESTATION OF PREGNANCY: Status: RESOLVED | Noted: 2021-03-06 | Resolved: 2025-03-25

## 2025-03-25 LAB
ABO GROUP BLD: NORMAL
ALBUMIN SERPL-MCNC: 4 G/DL (ref 3.5–5.2)
ALBUMIN/GLOB SERPL: 1.8 G/DL
ALP SERPL-CCNC: 44 U/L (ref 39–117)
ALT SERPL W P-5'-P-CCNC: 15 U/L (ref 1–33)
ANION GAP SERPL CALCULATED.3IONS-SCNC: 13 MMOL/L (ref 5–15)
AST SERPL-CCNC: 15 U/L (ref 1–32)
BASOPHILS # BLD AUTO: 0.02 10*3/MM3 (ref 0–0.2)
BASOPHILS NFR BLD AUTO: 0.3 % (ref 0–1.5)
BILIRUB SERPL-MCNC: 0.5 MG/DL (ref 0–1.2)
BLD GP AB SCN SERPL QL: NEGATIVE
BUN SERPL-MCNC: 12 MG/DL (ref 6–20)
BUN/CREAT SERPL: 33.3 (ref 7–25)
CALCIUM SPEC-SCNC: 8.9 MG/DL (ref 8.6–10.5)
CHLORIDE SERPL-SCNC: 103 MMOL/L (ref 98–107)
CO2 SERPL-SCNC: 20 MMOL/L (ref 22–29)
CREAT SERPL-MCNC: 0.36 MG/DL (ref 0.57–1)
DEPRECATED RDW RBC AUTO: 36.3 FL (ref 37–54)
EGFRCR SERPLBLD CKD-EPI 2021: 140.3 ML/MIN/1.73
EOSINOPHIL # BLD AUTO: 0.02 10*3/MM3 (ref 0–0.4)
EOSINOPHIL NFR BLD AUTO: 0.3 % (ref 0.3–6.2)
ERYTHROCYTE [DISTWIDTH] IN BLOOD BY AUTOMATED COUNT: 12 % (ref 12.3–15.4)
GLOBULIN UR ELPH-MCNC: 2.2 GM/DL
GLUCOSE SERPL-MCNC: 117 MG/DL (ref 65–99)
HBA1C MFR BLD: 4.5 % (ref 4.8–5.6)
HBV SURFACE AG SERPL QL IA: NORMAL
HCT VFR BLD AUTO: 31.8 % (ref 34–46.6)
HCV AB SER QL: NORMAL
HGB BLD-MCNC: 11.4 G/DL (ref 12–15.9)
HIV 1+2 AB+HIV1 P24 AG SERPL QL IA: NORMAL
IMM GRANULOCYTES # BLD AUTO: 0.04 10*3/MM3 (ref 0–0.05)
IMM GRANULOCYTES NFR BLD AUTO: 0.6 % (ref 0–0.5)
LYMPHOCYTES # BLD AUTO: 1.42 10*3/MM3 (ref 0.7–3.1)
LYMPHOCYTES NFR BLD AUTO: 20.4 % (ref 19.6–45.3)
MCH RBC QN AUTO: 30 PG (ref 26.6–33)
MCHC RBC AUTO-ENTMCNC: 35.8 G/DL (ref 31.5–35.7)
MCV RBC AUTO: 83.7 FL (ref 79–97)
MONOCYTES # BLD AUTO: 0.27 10*3/MM3 (ref 0.1–0.9)
MONOCYTES NFR BLD AUTO: 3.9 % (ref 5–12)
NEUTROPHILS NFR BLD AUTO: 5.18 10*3/MM3 (ref 1.7–7)
NEUTROPHILS NFR BLD AUTO: 74.5 % (ref 42.7–76)
NRBC BLD AUTO-RTO: 0 /100 WBC (ref 0–0.2)
PLATELET # BLD AUTO: 208 10*3/MM3 (ref 140–450)
PMV BLD AUTO: 10.6 FL (ref 6–12)
POTASSIUM SERPL-SCNC: 3.5 MMOL/L (ref 3.5–5.2)
PROT SERPL-MCNC: 6.2 G/DL (ref 6–8.5)
RBC # BLD AUTO: 3.8 10*6/MM3 (ref 3.77–5.28)
RH BLD: POSITIVE
SODIUM SERPL-SCNC: 136 MMOL/L (ref 136–145)
T&S EXPIRATION DATE: NORMAL
T4 SERPL-MCNC: 15.2 MCG/DL (ref 4.5–11.7)
TREPONEMA PALLIDUM IGG+IGM AB [PRESENCE] IN SERUM OR PLASMA BY IMMUNOASSAY: NORMAL
TSH SERPL DL<=0.05 MIU/L-ACNC: 0.65 UIU/ML (ref 0.27–4.2)
WBC NRBC COR # BLD AUTO: 6.95 10*3/MM3 (ref 3.4–10.8)

## 2025-03-25 PROCEDURE — 87340 HEPATITIS B SURFACE AG IA: CPT | Performed by: OBSTETRICS & GYNECOLOGY

## 2025-03-25 PROCEDURE — 86780 TREPONEMA PALLIDUM: CPT | Performed by: OBSTETRICS & GYNECOLOGY

## 2025-03-25 PROCEDURE — 83036 HEMOGLOBIN GLYCOSYLATED A1C: CPT | Performed by: OBSTETRICS & GYNECOLOGY

## 2025-03-25 PROCEDURE — 25010000002 THIAMINE HCL 200 MG/2ML SOLUTION: Performed by: OBSTETRICS & GYNECOLOGY

## 2025-03-25 PROCEDURE — 96361 HYDRATE IV INFUSION ADD-ON: CPT

## 2025-03-25 PROCEDURE — G0432 EIA HIV-1/HIV-2 SCREEN: HCPCS | Performed by: OBSTETRICS & GYNECOLOGY

## 2025-03-25 PROCEDURE — 86901 BLOOD TYPING SEROLOGIC RH(D): CPT | Performed by: OBSTETRICS & GYNECOLOGY

## 2025-03-25 PROCEDURE — G0463 HOSPITAL OUTPT CLINIC VISIT: HCPCS

## 2025-03-25 PROCEDURE — 86850 RBC ANTIBODY SCREEN: CPT | Performed by: OBSTETRICS & GYNECOLOGY

## 2025-03-25 PROCEDURE — 96374 THER/PROPH/DIAG INJ IV PUSH: CPT

## 2025-03-25 PROCEDURE — 25010000002 DIPHENHYDRAMINE PER 50 MG: Performed by: OBSTETRICS & GYNECOLOGY

## 2025-03-25 PROCEDURE — G0378 HOSPITAL OBSERVATION PER HR: HCPCS

## 2025-03-25 PROCEDURE — 25010000002 METOCLOPRAMIDE PER 10 MG: Performed by: OBSTETRICS & GYNECOLOGY

## 2025-03-25 PROCEDURE — 86900 BLOOD TYPING SEROLOGIC ABO: CPT | Performed by: OBSTETRICS & GYNECOLOGY

## 2025-03-25 PROCEDURE — 96376 TX/PRO/DX INJ SAME DRUG ADON: CPT

## 2025-03-25 PROCEDURE — 84436 ASSAY OF TOTAL THYROXINE: CPT | Performed by: OBSTETRICS & GYNECOLOGY

## 2025-03-25 PROCEDURE — 96375 TX/PRO/DX INJ NEW DRUG ADDON: CPT

## 2025-03-25 PROCEDURE — 86803 HEPATITIS C AB TEST: CPT | Performed by: OBSTETRICS & GYNECOLOGY

## 2025-03-25 PROCEDURE — 86762 RUBELLA ANTIBODY: CPT | Performed by: OBSTETRICS & GYNECOLOGY

## 2025-03-25 PROCEDURE — 80050 GENERAL HEALTH PANEL: CPT | Performed by: OBSTETRICS & GYNECOLOGY

## 2025-03-25 PROCEDURE — 0502F SUBSEQUENT PRENATAL CARE: CPT | Performed by: OBSTETRICS & GYNECOLOGY

## 2025-03-25 PROCEDURE — 25810000003 LACTATED RINGERS SOLUTION: Performed by: OBSTETRICS & GYNECOLOGY

## 2025-03-25 RX ORDER — SODIUM CHLORIDE 0.9 % (FLUSH) 0.9 %
10 SYRINGE (ML) INJECTION EVERY 12 HOURS SCHEDULED
Status: CANCELLED | OUTPATIENT
Start: 2025-03-25

## 2025-03-25 RX ORDER — THIAMINE HYDROCHLORIDE 100 MG/ML
100 INJECTION, SOLUTION INTRAMUSCULAR; INTRAVENOUS DAILY
Status: CANCELLED | OUTPATIENT
Start: 2025-03-25

## 2025-03-25 RX ORDER — SODIUM CHLORIDE 0.9 % (FLUSH) 0.9 %
10 SYRINGE (ML) INJECTION AS NEEDED
Status: DISCONTINUED | OUTPATIENT
Start: 2025-03-25 | End: 2025-03-26 | Stop reason: HOSPADM

## 2025-03-25 RX ORDER — ONDANSETRON 4 MG/1
8 TABLET, ORALLY DISINTEGRATING ORAL EVERY 8 HOURS PRN
Status: CANCELLED | OUTPATIENT
Start: 2025-03-25

## 2025-03-25 RX ORDER — LIDOCAINE HYDROCHLORIDE 10 MG/ML
0.5 INJECTION, SOLUTION EPIDURAL; INFILTRATION; INTRACAUDAL; PERINEURAL ONCE AS NEEDED
Status: CANCELLED | OUTPATIENT
Start: 2025-03-25

## 2025-03-25 RX ORDER — HYDROXYZINE HYDROCHLORIDE 50 MG/ML
50 INJECTION, SOLUTION INTRAMUSCULAR EVERY 4 HOURS PRN
Status: CANCELLED | OUTPATIENT
Start: 2025-03-25

## 2025-03-25 RX ORDER — SODIUM CHLORIDE 9 MG/ML
40 INJECTION, SOLUTION INTRAVENOUS AS NEEDED
Status: DISCONTINUED | OUTPATIENT
Start: 2025-03-25 | End: 2025-03-26 | Stop reason: HOSPADM

## 2025-03-25 RX ORDER — LANOLIN ALCOHOL/MO/W.PET/CERES
50 CREAM (GRAM) TOPICAL 3 TIMES DAILY
Status: CANCELLED | OUTPATIENT
Start: 2025-03-25

## 2025-03-25 RX ORDER — SODIUM CHLORIDE 0.9 % (FLUSH) 0.9 %
10 SYRINGE (ML) INJECTION AS NEEDED
Status: CANCELLED | OUTPATIENT
Start: 2025-03-25

## 2025-03-25 RX ORDER — LIDOCAINE HYDROCHLORIDE 10 MG/ML
0.5 INJECTION, SOLUTION EPIDURAL; INFILTRATION; INTRACAUDAL; PERINEURAL ONCE AS NEEDED
Status: DISCONTINUED | OUTPATIENT
Start: 2025-03-25 | End: 2025-03-26 | Stop reason: HOSPADM

## 2025-03-25 RX ORDER — SODIUM CHLORIDE 9 MG/ML
40 INJECTION, SOLUTION INTRAVENOUS AS NEEDED
Status: CANCELLED | OUTPATIENT
Start: 2025-03-25

## 2025-03-25 RX ORDER — DIPHENHYDRAMINE HYDROCHLORIDE 50 MG/ML
25 INJECTION, SOLUTION INTRAMUSCULAR; INTRAVENOUS EVERY 6 HOURS
Status: DISCONTINUED | OUTPATIENT
Start: 2025-03-25 | End: 2025-03-26

## 2025-03-25 RX ORDER — HYDROXYZINE HYDROCHLORIDE 50 MG/ML
50 INJECTION, SOLUTION INTRAMUSCULAR EVERY 4 HOURS PRN
Status: DISCONTINUED | OUTPATIENT
Start: 2025-03-25 | End: 2025-03-26

## 2025-03-25 RX ORDER — DEXTROSE MONOHYDRATE, SODIUM CHLORIDE, AND POTASSIUM CHLORIDE 50; 1.49; 9 G/1000ML; G/1000ML; G/1000ML
125 INJECTION, SOLUTION INTRAVENOUS CONTINUOUS
Status: CANCELLED | OUTPATIENT
Start: 2025-03-25 | End: 2025-03-27

## 2025-03-25 RX ORDER — DEXTROSE MONOHYDRATE, SODIUM CHLORIDE, AND POTASSIUM CHLORIDE 50; 1.49; 9 G/1000ML; G/1000ML; G/1000ML
125 INJECTION, SOLUTION INTRAVENOUS CONTINUOUS
Status: DISCONTINUED | OUTPATIENT
Start: 2025-03-25 | End: 2025-03-26 | Stop reason: HOSPADM

## 2025-03-25 RX ORDER — FAMOTIDINE 10 MG/ML
20 INJECTION, SOLUTION INTRAVENOUS EVERY 12 HOURS SCHEDULED
Status: DISCONTINUED | OUTPATIENT
Start: 2025-03-25 | End: 2025-03-26

## 2025-03-25 RX ORDER — METOCLOPRAMIDE HYDROCHLORIDE 5 MG/ML
10 INJECTION INTRAMUSCULAR; INTRAVENOUS EVERY 6 HOURS
Status: DISCONTINUED | OUTPATIENT
Start: 2025-03-25 | End: 2025-03-26

## 2025-03-25 RX ORDER — METOCLOPRAMIDE HYDROCHLORIDE 5 MG/ML
10 INJECTION INTRAMUSCULAR; INTRAVENOUS EVERY 6 HOURS
Status: CANCELLED | OUTPATIENT
Start: 2025-03-25

## 2025-03-25 RX ORDER — ONDANSETRON 4 MG/1
8 TABLET, ORALLY DISINTEGRATING ORAL EVERY 8 HOURS PRN
Status: DISCONTINUED | OUTPATIENT
Start: 2025-03-25 | End: 2025-03-26 | Stop reason: HOSPADM

## 2025-03-25 RX ORDER — THIAMINE HYDROCHLORIDE 100 MG/ML
100 INJECTION, SOLUTION INTRAMUSCULAR; INTRAVENOUS DAILY
Status: DISCONTINUED | OUTPATIENT
Start: 2025-03-25 | End: 2025-03-26

## 2025-03-25 RX ORDER — LANOLIN ALCOHOL/MO/W.PET/CERES
50 CREAM (GRAM) TOPICAL 3 TIMES DAILY
Status: DISCONTINUED | OUTPATIENT
Start: 2025-03-25 | End: 2025-03-26

## 2025-03-25 RX ORDER — DIPHENHYDRAMINE HYDROCHLORIDE 50 MG/ML
25 INJECTION, SOLUTION INTRAMUSCULAR; INTRAVENOUS EVERY 6 HOURS
Status: CANCELLED | OUTPATIENT
Start: 2025-03-25

## 2025-03-25 RX ORDER — SODIUM CHLORIDE 0.9 % (FLUSH) 0.9 %
10 SYRINGE (ML) INJECTION EVERY 12 HOURS SCHEDULED
Status: DISCONTINUED | OUTPATIENT
Start: 2025-03-25 | End: 2025-03-26 | Stop reason: HOSPADM

## 2025-03-25 RX ADMIN — SODIUM CHLORIDE, SODIUM LACTATE, POTASSIUM CHLORIDE, AND CALCIUM CHLORIDE 1000 ML: .6; .31; .03; .02 INJECTION, SOLUTION INTRAVENOUS at 12:00

## 2025-03-25 RX ADMIN — METOCLOPRAMIDE 10 MG: 5 INJECTION, SOLUTION INTRAMUSCULAR; INTRAVENOUS at 18:28

## 2025-03-25 RX ADMIN — THIAMINE HYDROCHLORIDE 100 MG: 100 INJECTION, SOLUTION INTRAMUSCULAR; INTRAVENOUS at 12:33

## 2025-03-25 RX ADMIN — DEXTROSE, SODIUM CHLORIDE, AND POTASSIUM CHLORIDE 125 ML/HR: 5; .9; .15 INJECTION INTRAVENOUS at 14:10

## 2025-03-25 RX ADMIN — DIPHENHYDRAMINE HYDROCHLORIDE 25 MG: 50 INJECTION INTRAMUSCULAR; INTRAVENOUS at 17:48

## 2025-03-25 RX ADMIN — FAMOTIDINE 20 MG: 10 INJECTION, SOLUTION INTRAVENOUS at 21:04

## 2025-03-25 RX ADMIN — METOCLOPRAMIDE 10 MG: 5 INJECTION, SOLUTION INTRAMUSCULAR; INTRAVENOUS at 12:25

## 2025-03-25 RX ADMIN — DIPHENHYDRAMINE HYDROCHLORIDE 25 MG: 50 INJECTION INTRAMUSCULAR; INTRAVENOUS at 12:32

## 2025-03-25 RX ADMIN — PYRIDOXINE HCL TAB 50 MG 50 MG: 50 TAB at 14:12

## 2025-03-25 RX ADMIN — PYRIDOXINE HCL TAB 50 MG 50 MG: 50 TAB at 21:04

## 2025-03-25 RX ADMIN — FAMOTIDINE 20 MG: 10 INJECTION, SOLUTION INTRAVENOUS at 12:30

## 2025-03-25 RX ADMIN — DEXTROSE, SODIUM CHLORIDE, AND POTASSIUM CHLORIDE 125 ML/HR: 5; .9; .15 INJECTION INTRAVENOUS at 21:07

## 2025-03-25 NOTE — H&P
"[facility]  Obstetric History and Physical    Chief Complaint   Patient presents with    Hyperemesis Gravidarum       Subjective     Patient is a 29 y.o. female  currently at 9w3d, who presented for IPN reporting intractable nausea and vomiting.    Her prenatal care is complicated by  hyperemesis gravidarum, prior   desires repeat , and desires sterlization  valid consents currently not available.  Her previous obstetric/gynecological history is noted for is remarkable for hyperemesis,  birth secondary to PROM.    The following portions of the patients history were reviewed and updated as appropriate: current medications, allergies, past medical history, past surgical history, past family history, past social history, and problem list .       Prenatal Information:   Maternal Prenatal Labs  Blood Type ABO Type   Date Value Ref Range Status   2025 A  Final      Rh Status RH type   Date Value Ref Range Status   2025 Positive  Final      Antibody Screen Antibody Screen   Date Value Ref Range Status   2025 Negative  Final      Gonnorhea No results found for: \"GCCX\"   Chlamydia No results found for: \"CLAMYDCU\"   RPR No results found for: \"RPR\"   Syphilis Antibody No results found for: \"SYPHILIS\"   Rubella No results found for: \"RUBELLAIGGIN\", \"RUBELLAABIGG\"   Hepatitis B Surface Antigen Hepatitis B Surface Ag   Date Value Ref Range Status   2025 Non-Reactive Non-Reactive Final      HIV-1 Antibody No results found for: \"LABHIV1\"   Hepatitis C Antibody No results found for: \"HEPCAB\"   Rapid Urin Drug Screen No results found for: \"AMPMETHU\", \"BARBITSCNUR\", \"LABBENZSCN\", \"LABMETHSCN\", \"LABOPIASCN\", \"THCURSCR\", \"COCAINEUR\", \"COCSCRUR\", \"AMPHETSCREEN\", \"PROPOXSCN\", \"BUPRENORSCNU\", \"METHAMPSCNUR\", \"OXYCODONESCN\", \"TRICYCLICSCN\"   Group B Strep Culture No results found for: \"GBSANTIGEN\"           External Prenatal Results       Pregnancy Outside Results - Transcribed From " Office Records - See Scanned Records For Details       Test Value Date Time    ABO  A  25 1427    Rh  Positive  25 1427    Antibody Screen  Negative  25 1427    Varicella IgG       Rubella       Hgb  11.4 g/dL 25 1447       13.2 g/dL 25 1814    Hct  31.8 % 25 1447       38.4 % 25 1814    HgB A1c   4.50 % 25 1445    1h GTT       3h GTT Fasting       3h GTT 1 hour       3h GTT 2 hour       3h GTT 3 hour        Gonorrhea (discrete)       Chlamydia (discrete)       RPR       Syphils cascade: TP-Ab (FTA)       TP-Ab       TP-Ab (EIA)       TPPA       HBsAg  Non-Reactive  25 1446    Herpes Simplex Virus PCR       Herpes Simplex VIrus Culture       HIV  Non-Reactive  25 1445    Hep C RNA Quant PCR       Hep C Antibody  Non-Reactive  25 1446    AFP       NIPT       Cystic Fibrosis (Megan)       Cystic Fibroisis        Spinal Muscular atrophy       Fragile X       Group B Strep       GBS Susceptibility to Clindamycin       GBS Susceptibility to Erythromycin       Fetal Fibronectin       Genetic Testing, Maternal Blood                 Drug Screening       Test Value Date Time    Urine Drug Screen       Amphetamine Screen       Barbiturate Screen       Benzodiazepine Screen       Methadone Screen       Phencyclidine Screen       Opiates Screen       THC Screen       Cocaine Screen       Propoxyphene Screen       Buprenorphine Screen       Methamphetamine Screen       Oxycodone Screen       Tricyclic Antidepressants Screen                 Legend    ^: Historical                              Past OB History:       OB History    Para Term  AB Living   2 1 0 1 0 1   SAB IAB Ectopic Molar Multiple Live Births   0 0 0 0 0 1      # Outcome Date GA Lbr Wan/2nd Weight Sex Type Anes PTL Lv   2 Current            1  21 33w2d  2280 g (5 lb 0.4 oz) M CS-LTranv Spinal  MOMO      Name: DONTAE HARRINGTON      Apgar1: 7  Apgar5: 9      Obstetric  Comments   2021 - Alex        Past Medical History: Past Medical History:   Diagnosis Date    Hemorrhoids during pregnancy in third trimester     Hyperemesis 2020    Hyperemesis 2025    Mastitis associated with childbirth, delivered 2021    Melanoma in situ 2019    right labia    Urinary tract infection       Past Surgical History Past Surgical History:   Procedure Laterality Date     SECTION N/A 3/5/2021    Procedure:  SECTION PRIMARY;  Surgeon: Thai Lora MD;  Location: [facility] LABOR DELIVERY;  Service: Obstetrics/Gynecology;  Laterality: N/A;    VULVA SURGERY Right     excision RT vulva of melanoma in situ      Family History: Family History   Problem Relation Age of Onset    Diabetes Father     Breast cancer Paternal Grandmother     No Known Problems Mother     No Known Problems Sister     No Known Problems Brother     No Known Problems Maternal Grandmother     No Known Problems Maternal Grandfather     No Known Problems Paternal Grandfather       Social History:  reports that she has never smoked. She has never used smokeless tobacco.   reports that she does not currently use alcohol.   reports no history of drug use.         Review of Systems   Constitutional: Positive for decreased appetite, malaise/fatigue and weight loss.   HENT: Negative.     Eyes: Negative.    Cardiovascular: Negative.    Respiratory: Negative.     Endocrine: Negative.    Hematologic/Lymphatic: Negative.    Skin: Negative.    Musculoskeletal: Negative.    Gastrointestinal:  Positive for nausea and vomiting.   Genitourinary: Negative.    Neurological: Negative.    Psychiatric/Behavioral:  Positive for depression and suicidal ideas. The patient has insomnia and is nervous/anxious.    Allergic/Immunologic: Negative.          Objective       Vital Signs Range for the last 24 hours  Temperature: Temp:  [97.7 °F (36.5 °C)] 97.7 °F (36.5 °C)   Temp Source:     BP: BP: ()/(53-63) 97/62   Pulse:  Heart Rate:  [] 110   Respirations: Resp:  [16] 16   SPO2:     O2 Amount (l/min):     O2 Devices     Weight: Weight:  [61.7 kg (136 lb)] 61.7 kg (136 lb)     Physical Examination:   General:   alert, appears stated age, and cooperative   Skin:   normal   HEENT:  NC/AT   Lungs:   clear to auscultation bilaterally and normal percussion bilaterally   Heart:   regular rate and rhythm, S1, S2 normal, no murmur, click, rub or gallop   Abdomen:  soft, non-tender; bowel sounds normal; no masses,  no organomegaly   Lower Extremeties NO CCE   Pelvis:  Exam deferred.         Laboratory Results:   Lab Results (last 24 hours)       Procedure Component Value Units Date/Time    Hemoglobin A1c [[id number]]  (Abnormal) Collected: 03/25/25 1445    Specimen: Blood Updated: 03/25/25 1546     Hemoglobin A1C 4.50 %     Narrative:      Hemoglobin A1C Ranges:    Increased Risk for Diabetes  5.7% to 6.4%  Diabetes                     >= 6.5%  Diabetic Goal                < 7.0%    OB Panel With HIV and Treponema Palldium Ab [[id number]]  (Abnormal) Collected: 03/25/25 1445    Specimen: Blood Updated: 03/25/25 1541    Narrative:      The following orders were created for panel order OB Panel With HIV and Treponema Palldium Ab.  Procedure                               Abnormality         Status                     ---------                               -----------         ------                     Treponema pallidum AB w/...[[id number]]                      In process                 CBC Auto Differential[[id number]]        Abnormal            Final result               Hepatitis B Surface Antigen[[id number]]  Normal              Final result               Rubella Antibody, IgG[[id number]]                            In process                 OB Panel Type & Screen[[id number]]                           In process                 HIV-1 / O / 2 Ag / Antibody[[id number]]  Normal              Final result               Hepatitis C  Antibody[[id number]]         Normal              Final result                 Please view results for these tests on the individual orders.    HIV-1 / O / 2 Ag / Antibody [[id number]]  (Normal) Collected: 03/25/25 1445    Specimen: Blood Updated: 03/25/25 1541     HIV-1/ HIV-2 Non-Reactive    Narrative:      The HIV antibody/antigen combo assay is a qualitative assay for HIV that includes the p24 antigen as well as antibodies to HIV types 1 and 2. This test is intended to be used as a screening assay in the diagnosis of HIV infection in patients over the age of 2.  Results may be falsely decreased if patient taking Biotin.      Comprehensive Metabolic Panel [[id number]]  (Abnormal) Collected: 03/25/25 1446    Specimen: Blood Updated: 03/25/25 1535     Glucose 117 mg/dL      BUN 12 mg/dL      Creatinine 0.36 mg/dL      Sodium 136 mmol/L      Potassium 3.5 mmol/L      Chloride 103 mmol/L      CO2 20.0 mmol/L      Calcium 8.9 mg/dL      Total Protein 6.2 g/dL      Albumin 4.0 g/dL      ALT (SGPT) 15 U/L      AST (SGOT) 15 U/L      Alkaline Phosphatase 44 U/L      Total Bilirubin 0.5 mg/dL      Globulin 2.2 gm/dL      Comment: Calculated Result        A/G Ratio 1.8 g/dL      BUN/Creatinine Ratio 33.3     Anion Gap 13.0 mmol/L      eGFR 140.3 mL/min/1.73     Narrative:      GFR Categories in Chronic Kidney Disease (CKD)      GFR Category          GFR (mL/min/1.73)    Interpretation  G1                     90 or greater         Normal or high (1)  G2                      60-89                Mild decrease (1)  G3a                   45-59                Mild to moderate decrease  G3b                   30-44                Moderate to severe decrease  G4                    15-29                Severe decrease  G5                    14 or less           Kidney failure          (1)In the absence of evidence of kidney disease, neither GFR category G1 or G2 fulfill the criteria for CKD.    eGFR calculation 2021 CKD-EPI creatinine  equation, which does not include race as a factor    Hepatitis C Antibody [[id number]]  (Normal) Collected: 03/25/25 1446    Specimen: Blood Updated: 03/25/25 1532     Hepatitis C Ab Non-Reactive    Hepatitis B Surface Antigen [[id number]]  (Normal) Collected: 03/25/25 1446    Specimen: Blood Updated: 03/25/25 1532     Hepatitis B Surface Ag Non-Reactive    TSH [[id number]]  (Normal) Collected: 03/25/25 1446    Specimen: Blood Updated: 03/25/25 1531     TSH 0.645 uIU/mL     Megan Horizon 3 (SMA, CF, Fragile X) - Blood, [[id number]] Collected: 03/25/25 1438    Specimen: Blood Updated: 03/25/25 1508    Megan Panorama Prenatal Test: Chromosomes 13, 18, 21, X & Y: Triploidy 22Q.11.2 Deletion - Blood, [[id number]] Collected: 03/25/25 1437    Specimen: Blood Updated: 03/25/25 1507    CBC Auto Differential [[id number]]  (Abnormal) Collected: 03/25/25 1447    Specimen: Blood Updated: 03/25/25 1500     WBC 6.95 10*3/mm3      RBC 3.80 10*6/mm3      Hemoglobin 11.4 g/dL      Hematocrit 31.8 %      MCV 83.7 fL      MCH 30.0 pg      MCHC 35.8 g/dL      RDW 12.0 %      RDW-SD 36.3 fl      MPV 10.6 fL      Platelets 208 10*3/mm3      Neutrophil % 74.5 %      Lymphocyte % 20.4 %      Monocyte % 3.9 %      Eosinophil % 0.3 %      Basophil % 0.3 %      Immature Grans % 0.6 %      Neutrophils, Absolute 5.18 10*3/mm3      Lymphocytes, Absolute 1.42 10*3/mm3      Monocytes, Absolute 0.27 10*3/mm3      Eosinophils, Absolute 0.02 10*3/mm3      Basophils, Absolute 0.02 10*3/mm3      Immature Grans, Absolute 0.04 10*3/mm3      nRBC 0.0 /100 WBC     T4 [[id number]] Collected: 03/25/25 1445    Specimen: Blood Updated: 03/25/25 1454    Rubella Antibody, IgG [[id number]] Collected: 03/25/25 1446    Specimen: Blood Updated: 03/25/25 1454    Treponema pallidum AB w/Reflex RPR [[id number]] Collected: 03/25/25 1446    Specimen: Blood Updated: 03/25/25 1454          Radiology Review:  Imaging Results (Last 24 Hours)       ** No results found for the last  24 hours. **                Assessment:  1.  Intrauterine pregnancy at 9w3d weeks gestation   2.  Hyperemesis  3.  Hx of  Birth  4.  History of  section, desires repeat  5.  Desires Sterilization  6.  MDD without current suicidal ideology but formal evaluation indicated    Plan:  1. IV fluids, anti-emetics, and Consultation with  psychiatry  2. Plan of care has been reviewed with patient.  3.  Risks, benefits of treatment plan have been discussed.  4.  All questions have been answered.  5      Herbert Raymundo MD  3/25/2025  17:35 EDT

## 2025-03-25 NOTE — CASE MANAGEMENT/SOCIAL WORK
Continued Stay Note  [facility]     Patient Name: Gauri Hester  MRN: [id number]  Today's Date: 3/25/2025    Admit Date: 3/25/2025    Plan: MSW to foolw. Psych eval at d/c.   Discharge Plan       Row Name 03/25/25 1648       Plan    Plan MSW to foolw. Psych eval at d/c.    Plan Comments Visited pt and FOB. Pt reports struggles with pregnancy and nausea and vomiting. Pt made statement to MD that may be SI thoughts without a plan. Pt denies current SI but does report she can not cope with long term nausea and vomiting like her last pregnancy. Pt is open to considering therapy/ counseling. Discussed doing mental health eval prior to d/c to see if therapy would be a good option. Pt is agreeable. FOB did discuss his concerns with her current state and does also agree that she is not currently suicidal. MSW to follow and schedule Gurjit eval at d/c.                   Discharge Codes    No documentation.                       ADARSH Perez

## 2025-03-25 NOTE — LETTER
March 25, 2025     Patient: Gauri Hester   YOB: 1995   Date of Visit: 3/25/2025       To Whom It May Concern:    It is my medical opinion that Gauri Hester should remain out of work until until further notice due to life-threatening complications of pregnancy. He work-readiness will be re-assessed as appropriate at all follow-up visits. Please do not hesitate to contact my office, and with Ms Hester consent, I will be happy to discuss this serious health issue appropriate detail. .           Sincerely,        Herbert Raymundo MD    CC: No Recipients

## 2025-03-25 NOTE — PROGRESS NOTES
Subjective     Chief Complaint   Patient presents with    Initial Prenatal Visit     NOB c/o nausea and vomiting went to ED [facility] for eval. Vomiting maybe 100 times in last 2 weeks.  Patient has been on B6 and unisom.  Can't keep anything down.  Burping a lot.  Patient is not on any medication for hyperemesis .  Weight loss last able to keep chicken broth done 2-3 days ago        Gauri Hester is a 29 y.o. year old .  Patient's last menstrual period was 2025..  She presents to be seen to initiate prenatal care with our practice.    She is currently having problems with intractable nausea and vomiting  As it relates to the pregnancy, she has concerns regarding management of high risk pregnancy due to     Intractable nausea and vomiting    Hx of  Section  Desires repeat    Hx of  Delivery  PPROM 34 weeks  Breech    Desires sterilization  Well-counseled  Also  may consider vasectomy    The following portions of the patient's history were reviewed and updated as appropriate:vital signs and She  has a past medical history of Hemorrhoids during pregnancy in third trimester, Hyperemesis (), Hyperemesis (2025), Mastitis associated with childbirth, delivered (2021), Melanoma in situ (2019), and Urinary tract infection.  She does not have any pertinent problems on file.  She  has a past surgical history that includes Vulva surgery (Right) and  section (N/A, 3/5/2021).  Her family history includes Breast cancer in her paternal grandmother; Diabetes in her father; No Known Problems in her brother, maternal grandfather, maternal grandmother, mother, paternal grandfather, and sister.  She  reports that she has never smoked. She has never used smokeless tobacco. She reports that she does not currently use alcohol. She reports that she does not use drugs.  No current facility-administered medications for this visit.     No current outpatient medications on file.      Facility-Administered Medications Ordered in Other Visits   Medication Dose Route Frequency Provider Last Rate Last Admin    dextrose 5 % and sodium chloride 0.9 % with KCl 20 mEq/L infusion  125 mL/hr Intravenous Continuous Herbert Raymundo MD        diphenhydrAMINE (BENADRYL) injection 25 mg  25 mg Intravenous Q6H Herbert Raymundo MD   25 mg at 03/25/25 1232    famotidine (PEPCID) injection 20 mg  20 mg Intravenous Q12H Herbert Raymundo MD   20 mg at 03/25/25 1230    hydrOXYzine (VISTARIL) injection 50 mg  50 mg Intramuscular Q4H PRN Herbert Raymundo MD        lidocaine PF 1% (XYLOCAINE) injection 0.5 mL  0.5 mL Intradermal Once PRN Herbert Raymundo MD        metoclopramide (REGLAN) injection 10 mg  10 mg Intravenous Q6H Herbert Raymundo MD   10 mg at 03/25/25 1225    ondansetron ODT (ZOFRAN-ODT) disintegrating tablet 8 mg  8 mg Oral Q8H PRN Herbert Raymundo MD        Or    ondansetron (ZOFRAN) 8 mg in sodium chloride 0.9 % 50 mL IVPB  8 mg Intravenous Q8H PRN Herbert Raymundo MD        sodium chloride 0.9 % flush 10 mL  10 mL Intravenous Q12H Herbert Raymundo MD        sodium chloride 0.9 % flush 10 mL  10 mL Intravenous PRN Herbert Raymundo MD        sodium chloride 0.9 % infusion 40 mL  40 mL Intravenous PRN Herbert Raymundo MD        thiamine (B-1) injection 100 mg  100 mg Intravenous Daily Herbert Raymundo MD   100 mg at 03/25/25 1233    vitamin B-6 (PYRIDOXINE) tablet 50 mg  50 mg Oral TID Herbert Raymundo MD         No current facility-administered medications on file prior to visit.     Current Outpatient Medications on File Prior to Visit   Medication Sig    Prenatal Vit-Fe Fumarate-FA (PRENATAL VITAMIN PO) Take  by mouth.    [DISCONTINUED] acetaminophen (TYLENOL) 500 MG tablet Take 500 mg by mouth Every 6 (Six) Hours As Needed for Mild Pain .    [DISCONTINUED] diphenhydrAMINE (BENADRYL) 25 mg capsule Take 1 capsule by mouth Every 4 (Four) Hours As Needed for  Itching.    [DISCONTINUED] docusate sodium 100 MG capsule Take 1 capsule by mouth 2 (Two) Times a Day As Needed for Constipation.    [DISCONTINUED] ibuprofen (ADVIL,MOTRIN) 600 MG tablet Take 1 tablet by mouth Every 6 (Six) Hours.    [DISCONTINUED] norethindrone-ethinyl estradiol FE (Junel FE 1/20) 1-20 MG-MCG per tablet Take 1 tablet by mouth Daily.    [DISCONTINUED] oxyCODONE (ROXICODONE) 5 MG immediate release tablet Take 1 tablet by mouth Every 4 (Four) Hours As Needed for Moderate Pain .     She has no known allergies..    Review of Systems - Psychological ROS: positive for - anxiety, depression, mood swings, sleep disturbances, and transient suicidal ideation without plan  negative for - behavioral disorder, disorientation, hallucinations, hostility, physical abuse, or sexual abuse    Objective     Physical Exam  General:  well developed; well nourished  no acute distress   Constitutional: thin   Skin:  No suspicious lesions seen   Thyroid: normal to inspection and palpation   Lungs:  breathing is unlabored  clear to auscultation bilaterally   Heart:  regular rate and rhythm, S1, S2 normal, no murmur, click, rub or gallop   Breasts:  Not performed.   Abdomen: soft, non-tender; no masses  no umbilical or inginual hernias are present  no hepato-splenomegaly   Pelvis: Clinical staff was present for exam  External genitalia:  normal appearance of the external genitalia including Bartholin's and [place]'s glands.  :  urethral meatus normal; urethral hypermobility is absent.  Vaginal:  normal pink mucosa without prolapse or lesions.  Cervix:  normal appearance routine cultures obtained  Uterus:  symmetrically enlarged, consisent with 8-10 weeks size;  Adnexa:  normal bimanual exam of the adnexa.   Musculoskeletal: negative   Neuro: normal without focal findings, mental status, speech normal, alert and oriented x3, and HORTENCIA   Psych: oriented to time, place and person, mood and affect are within normal limits, pt is  a good historian; no memory problems were noted       Lab Review   CBC, CMP, UA, and ABORH    Imaging   Pelvic ultrasound images independantly reviewed - cwd    Assessment & Plan     ASSESSMENT:High Risk Pregnancy  IUP at 9w3d  History of  section desires repeat  Hx of  delivery following PPROM at 34 weeks and Breech  Desires Sterilization   Hyperemesis Gravidarum 20 lb weight loss. Unable to tolerate liquids  MDD with psychotic (transient suicidal ideation) features    PLAN  Tests ordered today:  No orders of the defined types were placed in this encounter.    Medications prescribed today:  No orders of the defined types were placed in this encounter.    Information reviewed: exercise in pregnancy, nutrition in pregnancy, weight gain in pregnancy, work and travel restrictions during pregnancy, list of OTC medications acceptable in pregnancy, and call coverage groups  Genetic testing reviewed: NIPT (Hope)  The problem list for pregnancy was initiated today  Will admit today for treatment of hyperemesis and I hope we can also get in-patient psychiatric evaluation      Follow up: 3 week(s)             Herbert Raymundo MD  2025

## 2025-03-25 NOTE — CONSULTS
visited patient to provide emotional support.  Spouse Kaden at bedside.  Together they shared about her first pregnancy, hyperemesis for 20 weeks, water broke at 34 weeks, breech, unplanned C/S.  Kaden stated he is relieved they are getting help earlier this time around.  Reflected with patient and spouse that I would imagine they have a lot of emotions still around the first pregnancy.  They both agreed.  Assured them that staff is here to support them emotionally as well as physically.  They appreciated narrative (birth story) and empathic listening.

## 2025-03-26 VITALS
HEIGHT: 62 IN | TEMPERATURE: 98.2 F | DIASTOLIC BLOOD PRESSURE: 52 MMHG | SYSTOLIC BLOOD PRESSURE: 94 MMHG | HEART RATE: 89 BPM | BODY MASS INDEX: 24.84 KG/M2 | WEIGHT: 135 LBS | RESPIRATION RATE: 18 BRPM

## 2025-03-26 LAB
REF LAB TEST METHOD: NORMAL
RUBV IGG SERPL IA-ACNC: 1.99 INDEX

## 2025-03-26 PROCEDURE — 25010000002 THIAMINE HCL 200 MG/2ML SOLUTION: Performed by: OBSTETRICS & GYNECOLOGY

## 2025-03-26 PROCEDURE — 96361 HYDRATE IV INFUSION ADD-ON: CPT

## 2025-03-26 PROCEDURE — 25010000002 DIPHENHYDRAMINE PER 50 MG: Performed by: OBSTETRICS & GYNECOLOGY

## 2025-03-26 PROCEDURE — 25010000002 METOCLOPRAMIDE PER 10 MG: Performed by: OBSTETRICS & GYNECOLOGY

## 2025-03-26 PROCEDURE — 63710000001 ONDANSETRON ODT 4 MG TABLET DISPERSIBLE: Performed by: OBSTETRICS & GYNECOLOGY

## 2025-03-26 PROCEDURE — G0378 HOSPITAL OBSERVATION PER HR: HCPCS

## 2025-03-26 PROCEDURE — 96376 TX/PRO/DX INJ SAME DRUG ADON: CPT

## 2025-03-26 RX ORDER — ONDANSETRON 8 MG/1
8 TABLET, ORALLY DISINTEGRATING ORAL EVERY 8 HOURS PRN
Qty: 30 TABLET | Refills: 2 | Status: SHIPPED | OUTPATIENT
Start: 2025-03-26

## 2025-03-26 RX ORDER — PROMETHAZINE HYDROCHLORIDE 25 MG/1
25 SUPPOSITORY RECTAL EVERY 6 HOURS PRN
Qty: 12 SUPPOSITORY | Refills: 1 | Status: SHIPPED | OUTPATIENT
Start: 2025-03-26

## 2025-03-26 RX ORDER — METOCLOPRAMIDE 10 MG/1
10 TABLET ORAL EVERY 8 HOURS PRN
Status: DISCONTINUED | OUTPATIENT
Start: 2025-03-26 | End: 2025-03-26 | Stop reason: HOSPADM

## 2025-03-26 RX ORDER — METOCLOPRAMIDE 10 MG/1
10 TABLET ORAL EVERY 8 HOURS PRN
Qty: 30 TABLET | Refills: 2 | Status: SHIPPED | OUTPATIENT
Start: 2025-03-26

## 2025-03-26 RX ADMIN — THIAMINE HYDROCHLORIDE 100 MG: 100 INJECTION, SOLUTION INTRAMUSCULAR; INTRAVENOUS at 08:22

## 2025-03-26 RX ADMIN — METOCLOPRAMIDE 10 MG: 5 INJECTION, SOLUTION INTRAMUSCULAR; INTRAVENOUS at 06:53

## 2025-03-26 RX ADMIN — PYRIDOXINE HCL TAB 50 MG 50 MG: 50 TAB at 08:22

## 2025-03-26 RX ADMIN — METOCLOPRAMIDE 10 MG: 5 INJECTION, SOLUTION INTRAMUSCULAR; INTRAVENOUS at 00:36

## 2025-03-26 RX ADMIN — ONDANSETRON 8 MG: 4 TABLET, ORALLY DISINTEGRATING ORAL at 14:22

## 2025-03-26 RX ADMIN — DIPHENHYDRAMINE HYDROCHLORIDE 25 MG: 50 INJECTION INTRAMUSCULAR; INTRAVENOUS at 00:37

## 2025-03-26 RX ADMIN — METOCLOPRAMIDE 10 MG: 10 TABLET ORAL at 14:22

## 2025-03-26 RX ADMIN — DEXTROSE, SODIUM CHLORIDE, AND POTASSIUM CHLORIDE 125 ML/HR: 5; .9; .15 INJECTION INTRAVENOUS at 04:34

## 2025-03-26 RX ADMIN — FAMOTIDINE 20 MG: 10 INJECTION, SOLUTION INTRAVENOUS at 08:23

## 2025-03-26 RX ADMIN — DIPHENHYDRAMINE HYDROCHLORIDE 25 MG: 50 INJECTION INTRAMUSCULAR; INTRAVENOUS at 08:22

## 2025-03-26 NOTE — CASE MANAGEMENT/SOCIAL WORK
Continued Stay Note  [facility]     Patient Name: Gauri Hester  MRN: [id number]  Today's Date: 3/26/2025    Admit Date: 3/25/2025    Plan: Home   Discharge Plan       Row Name 03/26/25 1505       Plan    Plan Home    Plan Comments Psych assessment completed. Pt. has been cleared to d/c and clinician emailed resources to MSW. MSW delivered resources to pt. at bedside. RN updated. MSW is available.    Final Discharge Disposition Code 01 - home or self-care                   Discharge Codes    No documentation.                 Expected Discharge Date and Time       Expected Discharge Date Expected Discharge Time    Mar 26, 2025               ADARSH Lee

## 2025-03-26 NOTE — DISCHARGE SUMMARY
Date of Discharge:  3/26/2025    Discharge Diagnosis: Stable pregnancy    Presenting Problem/History of Present Illness  Hyperemesis gravidarum with dehydration [O21.1]       Hospital Course  Patient is a 29 y.o. female presented for the initial prenatal visit with hyperemesis and volume depletion. She was treated with IVF and antiemetics and responded well. She is tolerating liquids well. She has had not vomiting. She also endorsed symptoms of MDD and will be evaluated by psychiatry before physically leaving the hospital.      Consults:   Consults       No orders found for last 30 day(s).            Pertinent Test Results:   Lab Results   Component Value Date    WBC 6.95 03/25/2025    HGB 11.4 (L) 03/25/2025    HCT 31.8 (L) 03/25/2025    MCV 83.7 03/25/2025     03/25/2025    HEPBSAG Non-Reactive 03/25/2025     Results from last 7 days   Lab Units 03/25/25  1446 03/19/25  1814   AST (SGOT) U/L 15 29     Results from last 7 days   Lab Units 03/25/25  1446 03/19/25  1814   ALT (SGPT) U/L 15 30      Results from last 7 days   Lab Units 03/25/25  1446 03/19/25  1814   CREATININE mg/dL 0.36* 0.59      Results from last 7 days   Lab Units 03/25/25  1446 03/19/25  1814   BILIRUBIN mg/dL 0.5 0.4       Condition on Discharge:  stable    Vital Signs  Temp:  [97.7 °F (36.5 °C)-98.7 °F (37.1 °C)] 98.7 °F (37.1 °C)  Heart Rate:  [] 70  Resp:  [16-18] 18  BP: ()/(49-63) 101/56    Physical Exam:   No exam performed today,    Discharge Disposition  Home or Self Care    Discharge Medications     Discharge Medications        New Medications        Instructions Start Date   metoclopramide 10 MG tablet  Commonly known as: REGLAN   Take 1 tablet by mouth Every 8 (Eight) Hours As Needed for nausea and vomiting.      ondansetron ODT 8 MG disintegrating tablet  Commonly known as: ZOFRAN-ODT   8 mg, Translingual, Every 8 Hours PRN      Promethegan 25 MG suppository  Generic drug: promethazine   25 mg, Rectal, Every 6  Hours PRN             Continue These Medications        Instructions Start Date   PRENATAL VITAMIN PO   Oral               Activity at Discharge: up ad ruben    Follow-up Appointments  Future Appointments   Date Time Provider Department Center   4/15/2025  9:15 AM Aleisha Henderson APRN [facility]         Test Results Pending at Discharge  Pending Labs       Order Current Status    Megan Horizon 3 (SMA, CF, Fragile X) - Blood, In process    Megan Panorama Prenatal Test: Chromosomes 13, 18, 21, X & Y: Triploidy 22Q.11.2 Deletion - Blood, In process             Herbert Raymundo MD  03/26/25  11:06 EDT

## 2025-03-26 NOTE — CONSULTS
"This therapist received a call from [facility] ADARSH Bee for a consult to assess for SI and provide mental health resources. Met with patient via telehealth. Patient presents to Hospital for hyperemesis gravidarum. Patient does serve as her own guardian. Patient is alert and oriented to oriented to person, place, time, and general circumstances. Patient mood is calm and conversant. There are no signs of hallucinations or delusions. At this time, the patient does not require a full behavioral health evaluation as she adamantly and convincingly denies SI/HI/AVH or any acute mental health concerns. The patient reports that she is 9 weeks pregnant and has experienced persistant nasuea and vomiting. She reports that this happened during her last pregnancy as well and during this pregnancy, she and her  have tried to be more vocal about her needs and experience. She reports that her OBGYN has been wonderful with addressing her concerns and she is feeling better physically and more hopeful about the remainder of her pregnancy after this hospitalization. Patient adamantly and convincingly denies any SI and reports that she has had moments of \"dark thoughts\" which would entail getting an  even though she does greatly desire her preganncy. Patient reports that there have been times where she has felt desperate for relieif and would wish that she was dead but adamantly denies ever wanting to hurt herself or others. The patients  is at her bedside and appears supportive. The patient denies any concerns for her mental health when she is not pregnant and denies any acute concerns today. Therapist discussed the option of seeing an outpatient therapist who is [facility] certified as this may be helpful while she is navigating this pregnancy. The patient is receptive to this. A list of [facility] therapists who accept her insurance was provided to Cristal MSABDOUL to provide the patient. The patient reports no other mental " health concerns at this time and is not interested in evaluation for inpatient psychiatric care. The patient does not present with criteria to warrant an involuntary petition or psychiatric hold at this time as she is not actively suicidal, homicidal, or displaying symptoms of an acute psychotic episode. Therapist offered resources for outpatient mental health providers and support in the area.  Therapist also discussed the availability of emergency behavioral health services 24/7 at through the [facility] and [facility].  Therapist assisted the patient in identifying risk factors that would indicate the need for higher level of care, such as acute withdrawal symptoms, thoughts to harm self or others and/or self-harming behavior(s). Encouraged patient to call 911, crisis hotlines, or present to the nearest emergency department should symptoms worsen, or in any crisis/emergency. The patient is agreeable and voiced understanding.    [place]-SUICIDE SEVERITY RATING SCALE  Psychiatric Inpatient Setting - Discharge Screener    Ask questions that are bold and underlined Discharge   Ask Questions 1 and 2 YES NO   Wish to be Dead:   Person endorses thoughts about a wish to be dead or not alive anymore, or wish to fall asleep and not wake up.  While you were here in the hospital, have you wished you were dead or wished you could go to sleep and not wake up?  x   Suicidal Thoughts:   General non-specific thoughts of wanting to end one's life/die by suicide, “I've thought about killing myself” without general thoughts of ways to kill oneself/associated methods, intent, or plan.   While you were here in the hospital, have you actually had thoughts about killing yourself?   x   If YES to 2, ask questions 3, 4, 5, and 6.  If NO to 2, go directly to question 6   3) Suicidal Thoughts with Method (without Specific Plan or Intent to Act):   Person endorses thoughts of suicide and has thought of a  least one method during the assessment period. This is different than a specific plan with time, place or method details worked out. “I thought about taking an overdose but I never made a specific plan as to when where or how I would actually do it….and I would never go through with it.”   Have you been thinking about how you might kill yourself?      4) Suicidal Intent (without Specific Plan):   Active suicidal thoughts of killing oneself and patient reports having some intent to act on such thoughts, as opposed to “I have the thoughts but I definitely will not do anything about them.”   Have you had these thoughts and had some intention of acting on them or do you have some intention of acting on them after you leave the hospital?      5) Suicide Intent with Specific Plan:   Thoughts of killing oneself with details of plan fully or partially worked out and person has some intent to carry it out.   Have you started to work out or worked out the details of how to kill yourself either for while you were here in the hospital or for after you leave the hospital? Do you intend to carry out this plan?        6) Suicide Behavior    While you were here in the hospital, have you done anything, started to do anything, or prepared to do anything to end your life?    Examples: Took pills, cut yourself, tried to hang yourself, took out pills but didn't swallow any because you changed your mind or someone took them from you, collected pills, secured a means of obtaining a gun, gave away valuables, wrote a will or suicide note, etc.  x     FLACO Vargas

## 2025-03-26 NOTE — CASE MANAGEMENT/SOCIAL WORK
Continued Stay Note  [facility]     Patient Name: Gauri Hester  MRN: [id number]  Today's Date: 3/26/2025    Admit Date: 3/25/2025    Plan: Psych assessment   Discharge Plan       Row Name 03/26/25 1113       Plan    Plan Psych assessment    Plan Comments RN notified MSW that pt. is discharging today. Pt. will need psych assessment by [facility]. MSW sent Epic chat to clinician. MSW was notified by clinician that there are several evaluations in front of pt. Clinician will reach out to MSW when she is ready for assessment. MSW updated RN. MSW is available.                   Discharge Codes    No documentation.                 Expected Discharge Date and Time       Expected Discharge Date Expected Discharge Time    Mar 26, 2025               ADARSH Lee

## 2025-03-28 ENCOUNTER — PATIENT ROUNDING (BHMG ONLY) (OUTPATIENT)
Dept: OBSTETRICS AND GYNECOLOGY | Facility: CLINIC | Age: 30
End: 2025-03-28
Payer: COMMERCIAL

## 2025-03-28 NOTE — PROGRESS NOTES
My name is KemiDAGOBERTO    I am with MALIA RODAS  [facility]  [address]  [place] [address] [phone]    I want to officially welcome you to our practice and ask about your recent visit.    Tell me about your visit with us.  What things went well?    We're always looking for ways to make our patients' experiences even better.  Do you have recommendations on ways we may improve?    Overall were you satisfied with your first visit to our practice?    I appreciate you taking the time to answer a few questions today.  Is there anything else I can do for you?    Thank you, and have a great day.

## 2025-04-01 ENCOUNTER — TELEPHONE (OUTPATIENT)
Dept: OBSTETRICS AND GYNECOLOGY | Facility: CLINIC | Age: 30
End: 2025-04-01
Payer: COMMERCIAL

## 2025-04-01 NOTE — TELEPHONE ENCOUNTER
Caller: Kaden Hester    Relationship to patient: Emergency Contact    Best call back number: [phone] (home)       Patient is needing: UPDATE FMLA PAPER WORK AND RE FAX. NEED TO FILL OUT SECTION 4B PLEASE INCLUDE INTERMITTENT TIME OFF NEEDED TO CARE FOR WIFE AND CHILD WHEN WIFE HAS FLARE UP DURING HER PREGNANCY. NEEDS TO HAVE 1 -3 DAYS A WEEK. START DATE NEEDS TO BE 3/17/2025

## 2025-04-02 LAB
Lab: NEGATIVE
Lab: NORMAL
NTRA CYSTIC FIBROSIS: NEGATIVE
NTRA FRAGILE X SYNDROME: NEGATIVE
NTRA SPINAL MUSCULAR ATROPHY: NEGATIVE

## 2025-04-03 ENCOUNTER — TELEPHONE (OUTPATIENT)
Dept: OBSTETRICS AND GYNECOLOGY | Facility: CLINIC | Age: 30
End: 2025-04-03
Payer: COMMERCIAL

## 2025-04-03 NOTE — TELEPHONE ENCOUNTER
I dont think I knew she had a , and certainly have not spoken to such an individual. Am I supposed to? Perhaps some other staff has?

## 2025-04-03 NOTE — TELEPHONE ENCOUNTER
Typically, there are short-term disability forms that the provider needs to fill out. If this is the case, we need them., If this is not the case. Please provide note indicating that she has hyperemesis gravidarum and is unable to return to work at this time. He readiness for work will be re-asses on about a monthly basis

## 2025-04-03 NOTE — TELEPHONE ENCOUNTER
Pt  states that he was just calling to check on the pts paperwork. This has already been discussed with the pt.

## 2025-04-03 NOTE — TELEPHONE ENCOUNTER
Called pt to inform of the faxed paperwork and she states that what was faxed is her 's paperwork. She is needing short term disability paperwork for herself as she is not able to use the letter that was written per Dr. Raymundo on its own. She has been provided the fax number, informed of the cost and will call to make payment to the front office. Additionally she has been made aware of the time frame for the paperwork to be completed of 15 business days. She demonstrates understanding and appreciation.

## 2025-04-03 NOTE — TELEPHONE ENCOUNTER
Patient was wanting to see if we spoke with her  at work about her FMLA. I let her know that we did not and that her FMLA paperwork was filled out and submitted into her chart. I let patient know I would verify that it was faxed. Per Mabel, she has faxed the paperwork as requested.    Pt informed and stated understanding.

## 2025-04-03 NOTE — TELEPHONE ENCOUNTER
Caller: Sasha Hester    Relationship: Emergency Contact    Best call back number: [phone] V/M OR CAN CALL SPOUSE SASHA [phone]    What form or medical record are you requesting:  PROOF OF DX THAT REQUIRES OB PT TO BE OFF WORK     Who is requesting this form or medical record from you: HUY HOWELL (PT)     How would you like to receive the form or medical records (pick-up, mail, fax): FAX  If fax, what is the fax number: [phone]    Timeframe paperwork needed: ASAP - BEFORE END OF TOMORROW IF POSSIBLE    Additional notes:   PT SPOUSE CALLED - PT IS NEEDING MEDICAL RECORD OF WHY OB PT IS REQUIRED TO BE OFF OF WORK - SHORT TERM DISABILITY     PLEASE CONTACT PT IF NEEDING TO DISCUSS    THANK YOU

## 2025-04-10 NOTE — TELEPHONE ENCOUNTER
Caller: TERRY     Relationship to patient:     Best call back number: [phone]    Patient is needing: TERRY FROM [facility] IS NEEDED AN UPDATED ON PAPERWORK REQUEST SENT IN FROM [facility].

## 2025-04-10 NOTE — TELEPHONE ENCOUNTER
Pt reports that the most recent call is for pt's short term disability, not her spouse. Jyothi was notified that it was faxed yesterday according to the pt, not received and needing to be faxed again. We will contact pt when the paperwork has been faxed again.

## 2025-04-10 NOTE — TELEPHONE ENCOUNTER
Left message for pt to return call.  Pt paperwork has been found, is being faxed again. Please have pt notify [facility] that her paperwork is on its way again.

## 2025-04-15 ENCOUNTER — ROUTINE PRENATAL (OUTPATIENT)
Dept: OBSTETRICS AND GYNECOLOGY | Facility: CLINIC | Age: 30
End: 2025-04-15
Payer: COMMERCIAL

## 2025-04-15 VITALS — BODY MASS INDEX: 25.61 KG/M2 | SYSTOLIC BLOOD PRESSURE: 104 MMHG | WEIGHT: 140 LBS | DIASTOLIC BLOOD PRESSURE: 60 MMHG

## 2025-04-15 DIAGNOSIS — Z98.891 HISTORY OF CESAREAN SECTION: ICD-10-CM

## 2025-04-15 DIAGNOSIS — F32.3 CURRENT SEVERE EPISODE OF MAJOR DEPRESSIVE DISORDER WITH PSYCHOTIC FEATURES WITHOUT PRIOR EPISODE: ICD-10-CM

## 2025-04-15 DIAGNOSIS — O21.1 SEVERE HYPEREMESIS GRAVIDARUM: ICD-10-CM

## 2025-04-15 DIAGNOSIS — O09.90 SUPERVISION OF HIGH RISK PREGNANCY, ANTEPARTUM: Primary | ICD-10-CM

## 2025-04-15 DIAGNOSIS — Z87.51 HISTORY OF PRETERM DELIVERY: ICD-10-CM

## 2025-04-15 RX ORDER — PROMETHAZINE HYDROCHLORIDE 25 MG/1
25 SUPPOSITORY RECTAL EVERY 6 HOURS PRN
Qty: 360 SUPPOSITORY | Refills: 1 | Status: SHIPPED | OUTPATIENT
Start: 2025-04-15

## 2025-04-15 RX ORDER — OMEPRAZOLE 40 MG/1
40 CAPSULE, DELAYED RELEASE ORAL DAILY
Qty: 90 CAPSULE | Refills: 3 | Status: SHIPPED | OUTPATIENT
Start: 2025-04-15

## 2025-04-15 RX ORDER — METOCLOPRAMIDE 10 MG/1
10 TABLET ORAL EVERY 8 HOURS PRN
Qty: 270 TABLET | Refills: 2 | Status: SHIPPED | OUTPATIENT
Start: 2025-04-15

## 2025-04-15 RX ORDER — ONDANSETRON 8 MG/1
8 TABLET, ORALLY DISINTEGRATING ORAL EVERY 8 HOURS PRN
Qty: 27 TABLET | Refills: 2 | Status: SHIPPED | OUTPATIENT
Start: 2025-04-15

## 2025-04-15 NOTE — PROGRESS NOTES
Chief Complaint   Patient presents with    Routine Prenatal Visit       HPI  Gauri is a  currently at 12w3d who today reports the following:  Nausea - improved as compared to the prior visit; Vaginal bleeding -  YES; Heartburn - YES.  She does report some improvement in hyperemesis but not much. She is able to shower alone now. Discussed mood- EPDS today in office is 11- she feels much better and feels like she is improving in mood as well as nausea/vomiting.     Review of Systems   Constitutional: Negative.    Gastrointestinal:  Positive for nausea and vomiting.   Neurological: Negative.    Psychiatric/Behavioral: Negative.          Objective  /60   Wt 63.5 kg (140 lb)   LMP 2025   BMI 25.61 kg/m²     Physical Exam  Vitals and nursing note reviewed.   Psychiatric:         Mood and Affect: Mood normal.         Behavior: Behavior normal.         Thought Content: Thought content normal.         Judgment: Judgment normal.         Prenatal Labs  Lab Results   Component Value Date    HGB 11.4 (L) 2025    RUBELLAABIGG 1.99 2025    HEPBSAG Non-Reactive 2025    ABSCRN Negative 2025    MPQ4XUC1 Non-Reactive 2025    HEPCVIRUSABY Non-Reactive 2025     2021    URINECX No growth 2025    CHLAMNAA Negative 2020    NGONORRHON Negative 2020       Assessment and Plan  Diagnoses and all orders for this visit:    1. Supervision of high risk pregnancy, antepartum (Primary)  -     ondansetron ODT (ZOFRAN-ODT) 8 MG disintegrating tablet; Place 1 tablet on the tongue Every 8 (Eight) Hours As Needed for Nausea or Vomiting.  Dispense: 27 tablet; Refill: 2    2. History of  section    3. History of  delivery    4. Severe hyperemesis gravidarum  -     ondansetron ODT (ZOFRAN-ODT) 8 MG disintegrating tablet; Place 1 tablet on the tongue Every 8 (Eight) Hours As Needed for Nausea or Vomiting.  Dispense: 27 tablet; Refill: 2    5. Current  severe episode of major depressive disorder with psychotic features without prior episode  -     ondansetron ODT (ZOFRAN-ODT) 8 MG disintegrating tablet; Place 1 tablet on the tongue Every 8 (Eight) Hours As Needed for Nausea or Vomiting.  Dispense: 27 tablet; Refill: 2    Other orders  -     omeprazole (priLOSEC) 40 MG capsule; Take 1 capsule by mouth Daily.  Dispense: 90 capsule; Refill: 3  -     metoclopramide (REGLAN) 10 MG tablet; Take 1 tablet by mouth Every 8 (Eight) Hours As Needed for nausea and vomiting.  Dispense: 270 tablet; Refill: 2  -     promethazine (PHENERGAN) 25 MG suppository; Insert 1 suppository into the rectum Every 6 (Six) Hours As Needed for Nausea.  Dispense: 360 suppository; Refill: 1        Continue with PNV's  Prenatal labs reviewed  Recommended she remain on short term disability at this time based on her current symptoms- she is reliant on 3 medications, 2 of which cause drowsiness and make it unable to do her job appropriately.   Tests scheduled today for her next visit none    Return in about 4 weeks (around 5/13/2025) for Return OB.    Aleisha Henderson, APRN  April 15, 2025

## 2025-04-15 NOTE — LETTER
April 15, 2025     Patient: Gauri Hester   YOB: 1995   Date of Visit: 4/15/2025       To Whom It May Concern:    It is my medical opinion that Gauri Hester {Work release (duty restriction):93773}.           Sincerely,        JOSHUA Hankins    CC: No Recipients

## 2025-04-18 ENCOUNTER — TELEPHONE (OUTPATIENT)
Dept: OBSTETRICS AND GYNECOLOGY | Facility: CLINIC | Age: 30
End: 2025-04-18
Payer: COMMERCIAL

## 2025-04-18 NOTE — TELEPHONE ENCOUNTER
A user error has taken place: encounter opened in error, closed for administrative reasons.    Conjuntivae and eyelids appear normal,  Sclerae : White without injection

## 2025-04-18 NOTE — TELEPHONE ENCOUNTER
Provider :DR MCCOY    Caller: BANG HARRINGTON    Best call back number: [phone]    Reason for call: PATIENT IS CALLING TO CHECK THE STATUS OF HER SHORT TERM PAPER WORK WERE HER TIME OFF HAS BEEN EXTENDED TO AT LEAST 5/13//PLEASE FOLLOW UP

## 2025-04-18 NOTE — TELEPHONE ENCOUNTER
Call to patient and advised no paper work in chart for her only for her .  She states she will call [employer] and get forms faxed and we will complete upon arrival

## 2025-04-22 ENCOUNTER — TELEPHONE (OUTPATIENT)
Dept: OBSTETRICS AND GYNECOLOGY | Facility: CLINIC | Age: 30
End: 2025-04-22

## 2025-04-22 NOTE — TELEPHONE ENCOUNTER
Caller: Gauri Hester    Relationship: Self    Best call back number: [phone]    What is the best time to reach you: ANY    Who are you requesting to speak with (clinical staff, provider,  specific staff member): CLINICAL     What was the call regarding: PT CALLING TO LET OFFICE KNOW SHORT TERM DISABILITY PAPERWORK IS BEING FAXED TO OFFICE TODAY TO BE FILLED OUT.

## 2025-05-12 PROBLEM — Z3A.16 16 WEEKS GESTATION OF PREGNANCY: Status: ACTIVE | Noted: 2025-05-12

## 2025-05-13 ENCOUNTER — ROUTINE PRENATAL (OUTPATIENT)
Dept: OBSTETRICS AND GYNECOLOGY | Facility: CLINIC | Age: 30
End: 2025-05-13
Payer: COMMERCIAL

## 2025-05-13 VITALS — DIASTOLIC BLOOD PRESSURE: 66 MMHG | WEIGHT: 151 LBS | SYSTOLIC BLOOD PRESSURE: 110 MMHG | BODY MASS INDEX: 27.62 KG/M2

## 2025-05-13 DIAGNOSIS — O09.90 SUPERVISION OF HIGH RISK PREGNANCY, ANTEPARTUM: Primary | ICD-10-CM

## 2025-05-13 DIAGNOSIS — Z98.891 HISTORY OF CESAREAN SECTION: ICD-10-CM

## 2025-05-13 DIAGNOSIS — Z87.51 HISTORY OF PRETERM DELIVERY: ICD-10-CM

## 2025-05-13 DIAGNOSIS — F32.3 CURRENT SEVERE EPISODE OF MAJOR DEPRESSIVE DISORDER WITH PSYCHOTIC FEATURES WITHOUT PRIOR EPISODE: ICD-10-CM

## 2025-05-13 DIAGNOSIS — Z3A.16 16 WEEKS GESTATION OF PREGNANCY: ICD-10-CM

## 2025-05-13 LAB
GLUCOSE UR STRIP-MCNC: NEGATIVE MG/DL
PROT UR STRIP-MCNC: NEGATIVE MG/DL

## 2025-05-13 NOTE — PROGRESS NOTES
Chief Complaint   Patient presents with    Routine Prenatal Visit     Ob visit c/o none        HPI: Gauri is a  currently at 16w3d who today reports the following:Headache - No ; Visual change - No ; Swelling in legs - No ; Nausea - No ; Constipation - No; Diarrhea - No ; Contractions - No ; Leaking fluid - No ; Vaginal bleeding -  No.      ROS: Pertinent items are noted in HPI.  Vitals: See prenatal flowsheet     EXAM:  Abdomen: See physician component of prenatal flowsheet   Urine glucose/protein: See physician component of prenatal flowsheet   Pelvic: See physician component of prenatal flowsheet     Prenatal Labs  Lab Results   Component Value Date    HGB 11.4 (L) 2025    RUBELLAABIGG 1.99 2025    HEPBSAG Non-Reactive 2025    ABO A 2025    RH Positive 2025    ABSCRN Negative 2025    QVK5SPT5 Non-Reactive 2025    HEPCVIRUSABY Non-Reactive 2025    URINECX No growth 2025       MDM:  Impression:supervision of high risk pregnancy and Previous C/S with planned repeat C/S  Tests done today: none  Specific topics discussed at today's visit:  rationale for MSAFP in pregnancy, declines  No orders of the defined types were placed in this encounter.    Next visit:U/S for anatomic screening

## 2025-05-22 ENCOUNTER — PATIENT MESSAGE (OUTPATIENT)
Dept: OBSTETRICS AND GYNECOLOGY | Facility: CLINIC | Age: 30
End: 2025-05-22
Payer: COMMERCIAL

## 2025-05-22 NOTE — LETTER
"May 23, 2025     Gauri Hester \"Gauri Hester\"    Patient: Gauri Hester   YOB: 1995   Date of Visit: 5/22/2025     Dear Gauri Hester \"Gauri Hester\":       To whom it may concern:     Gauri Hester is currently pregnant and under the care of Dr. Herbert Raymundo, due to deliver on 10/25/2025. Please make the following accomodations for the duration of her pregnancy:    - No lifting > 25 pounds   - If standing for 2 hours, patient will need 10 minute break to sit   - Patient needs regular access to food and water.         Sincerely,     Re Gerard MD  Obstetrics and Gynecology  [facility]              "

## 2025-05-23 NOTE — TELEPHONE ENCOUNTER
Fax number has been provided to pt to fax paperwork to the office. [place] message sent with detailed information and message routed to provider for review.     Patient is requesting a letter to be written by provider in the meantime while the paperwork is being completed after reviewed.

## 2025-05-28 DIAGNOSIS — O09.90 SUPERVISION OF HIGH RISK PREGNANCY, ANTEPARTUM: ICD-10-CM

## 2025-05-28 DIAGNOSIS — O21.1 SEVERE HYPEREMESIS GRAVIDARUM: ICD-10-CM

## 2025-05-28 DIAGNOSIS — F32.3 CURRENT SEVERE EPISODE OF MAJOR DEPRESSIVE DISORDER WITH PSYCHOTIC FEATURES WITHOUT PRIOR EPISODE: ICD-10-CM

## 2025-05-28 RX ORDER — ONDANSETRON 8 MG/1
8 TABLET, ORALLY DISINTEGRATING ORAL EVERY 8 HOURS PRN
Qty: 27 TABLET | Refills: 2 | Status: SHIPPED | OUTPATIENT
Start: 2025-05-28

## 2025-05-30 ENCOUNTER — TELEPHONE (OUTPATIENT)
Dept: OBSTETRICS AND GYNECOLOGY | Facility: CLINIC | Age: 30
End: 2025-05-30

## 2025-05-30 NOTE — TELEPHONE ENCOUNTER
PROVIDER:  DR MCCOY    Patient: BANG HARRINGTON     Phone#: [phone]    REASON FOR THE CALL: PATIENT WAS CALLING TO CHECK THE STATUS OF HER RESTRICTIONS PAPERWORK//SHE HAD TO BE SENT HOME TODAY BECAUSE OF NAUSEA AND VOMITING//THEY HAVE SIT DOWN RESTRICTED JOBS FOR HER BUT SHE NEEDS THE PAPERWORK FILLED OUT TO BE ABLE TO HAVE ONE OF THOSE POSITIONS//THE BLANK PAPERWORK IS IN MEDIA FROM 5/22/25//PLEASE FOLLOW UP

## 2025-06-02 ENCOUNTER — TELEPHONE (OUTPATIENT)
Dept: OBSTETRICS AND GYNECOLOGY | Facility: CLINIC | Age: 30
End: 2025-06-02

## 2025-06-02 NOTE — TELEPHONE ENCOUNTER
Pt informed and stated understanding.  Kaden states that plan is to drive for about an hour and then come back (will be back in 2.5- 3 hours before going to [facility] so that they can have  for a couple of days so that she can receive care and their child would not have to go to ED with them.) Kaden states that he will make sure they remain safe during this time and will ensure that she makes it back to [facility] for her evaluation.

## 2025-06-02 NOTE — TELEPHONE ENCOUNTER
Pt  reports that she is taking phenergan and it is making her very tired. She is currently sleeping. She has been vomiting, nauseous and weak. She passed out this morning. This has been within the past 5 days. She vomited 5-6 times this morning at work. She is having low blood pressure, dizziness as well.  is unaware of BP. Nurse at [facility] stated that pt needed to see OB and with her condition on Friday and today, she should not return to work until she is ok to return to work. At this time, [facility] will not allow her to return to work. She is able to tolerate water but not food. She was able to keep down a couple of pretzels.     Reports that he thinks that the Reglan is getting to her. She is feeling like she is having depression concerns. She is having a lot of crying at this time. She is overall having a hard time. She is able to keep down fluids. This is going better than the first time she was taking her medications but is vomiting a few times per day.  states that this does not seem like her normal depression and seems very hormonal.     Pt asked PHQ questions:     PHQ-9  Over the last two weeks (pt has only been feeling down over the past SIX days) how often have you been bothered by any of the following problems?   Not at all (0), Several days (1), More than half of the days (2), Nearly every day (3)   Little interest or pleasure in doing things 3  Feeling down, depressed or hopeless 3  Trouble falling or staying asleep, or sleeping too much 3  Feeling tired or having little energy 3  Poor appetite or overeating 1- pt reports being scared of vomiting  Feeling bad about yourself- or that you are a failure or have let yourself or your family down 3  Trouble concentrating on things, such as reading or watching tv 3  Moving or speaking so slowly that other people could have noticed. Or the opposite- being so fidgety or restless that you have been moving around a lot more than usual  0  Thoughts that you would be better off dead, or hurting yourself in some way. 3  Pt reports that she does not have any intention of acting on this. She does not have any plan. Pt does feel like she has a good support system. Pt advised that she would go to the [facility] ED in [place] if she were to develop any kind of plan or intention. Pt states that wants to go to [facility] ED for evaluation

## 2025-06-02 NOTE — TELEPHONE ENCOUNTER
Agree with plan for her to go to [facility] for evaluation of her depressive symptoms with suicidal thoughts (no plan) once she is evaluated for mental health concerns she may need er visit for labs and fluids given her hyperemesis if this is not addressed at her visit at [facility].   JOSHUA Tadeo

## 2025-06-02 NOTE — TELEPHONE ENCOUNTER
Caller: SASHA     Relationship to patient:      Best call back number: [phone]    Patient is needing: PTS HUSBANDS IS CALLING REGARDING HIS WIFE'S SEVERE HYPEREMESIS GRAVIDARUM / PTS SYMPTOMS HAVE RETURNED & SHE PASSED OUT AT THE NURSES STATION AT WORK THIS MORNING AND WAS TOLD NOT TO RETURN TO WORK UNTIL HER DOCTOR APPROVES. PER OFFICE SEND HIGH PRIORITY MESSAGE IN FOR THE OFFICE TO RESPOND / PTS  WOULD LIKE A CALL BACK ON WHAT THEY SHOULD DO AND IF THEY NEED TO COME IN FOR AN APPT.

## 2025-06-03 ENCOUNTER — ROUTINE PRENATAL (OUTPATIENT)
Dept: OBSTETRICS AND GYNECOLOGY | Facility: CLINIC | Age: 30
End: 2025-06-03
Payer: COMMERCIAL

## 2025-06-03 VITALS — SYSTOLIC BLOOD PRESSURE: 100 MMHG | DIASTOLIC BLOOD PRESSURE: 60 MMHG | BODY MASS INDEX: 26.34 KG/M2 | WEIGHT: 144 LBS

## 2025-06-03 DIAGNOSIS — O21.1 SEVERE HYPEREMESIS GRAVIDARUM: ICD-10-CM

## 2025-06-03 DIAGNOSIS — F32.3 CURRENT SEVERE EPISODE OF MAJOR DEPRESSIVE DISORDER WITH PSYCHOTIC FEATURES WITHOUT PRIOR EPISODE: ICD-10-CM

## 2025-06-03 DIAGNOSIS — O09.90 SUPERVISION OF HIGH RISK PREGNANCY, ANTEPARTUM: Primary | ICD-10-CM

## 2025-06-03 RX ORDER — PSEUDOEPHEDRINE HCL 30 MG
100 TABLET ORAL 2 TIMES DAILY
Qty: 60 CAPSULE | Refills: 1 | Status: SHIPPED | OUTPATIENT
Start: 2025-06-03

## 2025-06-03 RX ORDER — FAMOTIDINE 40 MG/1
40 TABLET, FILM COATED ORAL 2 TIMES DAILY
Qty: 60 TABLET | Refills: 2 | Status: SHIPPED | OUTPATIENT
Start: 2025-06-03

## 2025-06-03 RX ORDER — SERTRALINE HYDROCHLORIDE 25 MG/1
25 TABLET, FILM COATED ORAL DAILY
Qty: 90 TABLET | Refills: 0 | Status: SHIPPED | OUTPATIENT
Start: 2025-06-03

## 2025-06-06 ENCOUNTER — TELEPHONE (OUTPATIENT)
Dept: OBSTETRICS AND GYNECOLOGY | Facility: CLINIC | Age: 30
End: 2025-06-06
Payer: COMMERCIAL

## 2025-06-06 NOTE — TELEPHONE ENCOUNTER
Attempted to call Gauri to discuss symptoms and see if she had any success with bowel regimen, but sent to voicemail. Spoke with her  Kaden this morning and he felt like she was doing a slight bit better. Left non-detailed voicemail and encouraged patient to reach out via [employer] if she had any questions or concerns as it is Friday afternoon.

## 2025-06-09 PROBLEM — Z3A.20 20 WEEKS GESTATION OF PREGNANCY: Status: ACTIVE | Noted: 2025-05-12

## 2025-06-10 ENCOUNTER — HOSPITAL ENCOUNTER (OUTPATIENT)
Dept: WOMENS IMAGING | Facility: HOSPITAL | Age: 30
Discharge: HOME | End: 2025-06-10
Admitting: OBSTETRICS & GYNECOLOGY
Payer: COMMERCIAL

## 2025-06-10 ENCOUNTER — OFFICE VISIT (OUTPATIENT)
Dept: OBSTETRICS AND GYNECOLOGY | Facility: HOSPITAL | Age: 30
End: 2025-06-10
Payer: COMMERCIAL

## 2025-06-10 ENCOUNTER — ROUTINE PRENATAL (OUTPATIENT)
Dept: OBSTETRICS AND GYNECOLOGY | Facility: CLINIC | Age: 30
End: 2025-06-10
Payer: COMMERCIAL

## 2025-06-10 VITALS — DIASTOLIC BLOOD PRESSURE: 54 MMHG | SYSTOLIC BLOOD PRESSURE: 92 MMHG | BODY MASS INDEX: 26.52 KG/M2 | WEIGHT: 145 LBS

## 2025-06-10 VITALS — BODY MASS INDEX: 26.52 KG/M2 | WEIGHT: 145 LBS | DIASTOLIC BLOOD PRESSURE: 54 MMHG | SYSTOLIC BLOOD PRESSURE: 92 MMHG

## 2025-06-10 DIAGNOSIS — F32.3 CURRENT SEVERE EPISODE OF MAJOR DEPRESSIVE DISORDER WITH PSYCHOTIC FEATURES WITHOUT PRIOR EPISODE: ICD-10-CM

## 2025-06-10 DIAGNOSIS — Z98.891 HISTORY OF CESAREAN SECTION: ICD-10-CM

## 2025-06-10 DIAGNOSIS — O09.90 SUPERVISION OF HIGH RISK PREGNANCY, ANTEPARTUM: ICD-10-CM

## 2025-06-10 DIAGNOSIS — Z87.51 HISTORY OF PRETERM DELIVERY: ICD-10-CM

## 2025-06-10 DIAGNOSIS — O09.90 SUPERVISION OF HIGH RISK PREGNANCY, ANTEPARTUM: Primary | ICD-10-CM

## 2025-06-10 DIAGNOSIS — Z3A.20 20 WEEKS GESTATION OF PREGNANCY: ICD-10-CM

## 2025-06-10 PROCEDURE — 76811 OB US DETAILED SNGL FETUS: CPT

## 2025-06-10 NOTE — PROGRESS NOTES
Chief Complaint   Patient presents with    Routine Prenatal Visit     Ob visit hyperemesis and depression stopped reglan Saturday and started zoloft feeling some better but still struggling     Pregnancy Ultrasound     PDC ultrasound        HPI: Gauri is a  currently at 20w3d who today reports the following:Headache - No ; Visual change - No ; Swelling in legs - No ; Nausea - improved as compared to the prior visit ; Constipation - improved as compared to the prior visit; Diarrhea - No ; Contractions - No ; Leaking fluid - No ; Vaginal bleeding -  No.      ROS: Pertinent items are noted in HPI.  Vitals: See prenatal flowsheet     EXAM:  Abdomen: See physician component of prenatal flowsheet   Urine glucose/protein: See physician component of prenatal flowsheet   Pelvic: See physician component of prenatal flowsheet     Prenatal Labs  Lab Results   Component Value Date    HGB 11.5 2025    RUBELLAABIGG 1.99 2025    HEPBSAG Non-Reactive 2025    ABO A 2025    RH Positive 2025    ABSCRN Negative 2025    [id number] Non-Reactive 2025    HEPCVIRUSABY Non-Reactive 2025    URINECX No growth 2025       MDM:  Impression:supervision of high risk pregnancy, Previous C/S with planned repeat C/S, and Hyperemesis, improving. Has stopped Reglan primarily due to concern about affect on mood. MDD, just recently started Zoloft and tolerated 25mg will increase to 50mg and make formal referral to psychiatry. Consistent bowel regimen discussed. Discussed short and long-term disability, forms completed. .   Tests done today: U/S for anatomic screening   Specific topics discussed at today's visit: as above  No orders of the defined types were placed in this encounter.    Next visit:will plan GCT for 28 weeks

## 2025-06-10 NOTE — PROGRESS NOTES
Patient denies bleeding, leaking fluid or contractions  NIPT negative  Patients next follow up with Dr. Raymundo is today

## 2025-07-03 ENCOUNTER — TELEMEDICINE (OUTPATIENT)
Dept: PSYCHIATRY | Facility: CLINIC | Age: 30
End: 2025-07-03
Payer: COMMERCIAL

## 2025-07-03 DIAGNOSIS — F41.1 GENERALIZED ANXIETY DISORDER: Primary | ICD-10-CM

## 2025-07-03 DIAGNOSIS — F33.1 MODERATE EPISODE OF RECURRENT MAJOR DEPRESSIVE DISORDER: ICD-10-CM
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
## 2025-07-08 ENCOUNTER — LAB (OUTPATIENT)
Dept: LAB | Facility: HOSPITAL | Age: 30
End: 2025-07-08
Payer: COMMERCIAL

## 2025-07-08 ENCOUNTER — ROUTINE PRENATAL (OUTPATIENT)
Dept: OBSTETRICS AND GYNECOLOGY | Facility: CLINIC | Age: 30
End: 2025-07-08
Payer: COMMERCIAL

## 2025-07-08 VITALS — WEIGHT: 147 LBS | SYSTOLIC BLOOD PRESSURE: 102 MMHG | BODY MASS INDEX: 26.89 KG/M2 | DIASTOLIC BLOOD PRESSURE: 58 MMHG

## 2025-07-08 DIAGNOSIS — Z34.82 PRENATAL CARE, SUBSEQUENT PREGNANCY, SECOND TRIMESTER: Primary | ICD-10-CM

## 2025-07-08 DIAGNOSIS — Z34.82 PRENATAL CARE, SUBSEQUENT PREGNANCY IN SECOND TRIMESTER: Primary | ICD-10-CM

## 2025-07-08 DIAGNOSIS — Z34.82 PRENATAL CARE, SUBSEQUENT PREGNANCY, SECOND TRIMESTER: ICD-10-CM

## 2025-07-08 LAB
DEPRECATED RDW RBC AUTO: 38.4 FL (ref 37–54)
ERYTHROCYTE [DISTWIDTH] IN BLOOD BY AUTOMATED COUNT: 11.7 % (ref 12.3–15.4)
GLUCOSE 1H P 100 G GLC PO SERPL-MCNC: 132 MG/DL (ref 65–139)
HCT VFR BLD AUTO: 32 % (ref 34–46.6)
HGB BLD-MCNC: 10.5 G/DL (ref 12–15.9)
MCH RBC QN AUTO: 29.9 PG (ref 26.6–33)
MCHC RBC AUTO-ENTMCNC: 32.8 G/DL (ref 31.5–35.7)
MCV RBC AUTO: 91.2 FL (ref 79–97)
PLATELET # BLD AUTO: 236 10*3/MM3 (ref 140–450)
PMV BLD AUTO: 10.3 FL (ref 6–12)
RBC # BLD AUTO: 3.51 10*6/MM3 (ref 3.77–5.28)
WBC NRBC COR # BLD AUTO: 11.41 10*3/MM3 (ref 3.4–10.8)

## 2025-07-08 PROCEDURE — 82948 REAGENT STRIP/BLOOD GLUCOSE: CPT

## 2025-07-08 PROCEDURE — 85027 COMPLETE CBC AUTOMATED: CPT

## 2025-07-08 PROCEDURE — 82950 GLUCOSE TEST: CPT

## 2025-07-08 NOTE — PROGRESS NOTES
Chief Complaint   Patient presents with    Routine Prenatal Visit     Ob visit with darlin at 12:45  c/o same        HPI: Gauri is a  currently at 24w3d who today reports the following:Headache - No ; Visual change - No ; Swelling in legs - No ; Nausea - stable ; Constipation - No; Diarrhea - No ; Contractions - No ; Leaking fluid - No ; Vaginal bleeding -  No.      ROS: Pertinent items are noted in HPI.  Vitals: See prenatal flowsheet     EXAM:  Abdomen: See physician component of prenatal flowsheet   Urine glucose/protein: See physician component of prenatal flowsheet   Pelvic: See physician component of prenatal flowsheet     Prenatal Labs  Lab Results   Component Value Date    HGB 11.5 2025    RUBELLAABIGG 1.99 2025    HEPBSAG Non-Reactive 2025    ABO A 2025    RH Positive 2025    ABSCRN Negative 2025    [id number] Non-Reactive 2025    HEPCVIRUSABY Non-Reactive 2025    URINECX No growth 2025       MDM:  Impression:supervision of high risk pregnancy, Multiparous patient with medical complications, and Hx of PTD. Hyperemesis, stable on multidrug regimen. MDD in remission on high risk medication  Tests done today: GCT and HgB  Specific topics discussed at today's visit: Maternal monitoring of fetal movement   labor signs and symptoms  Symptoms of preeclampsia  Medical regimen for nausea  No orders of the defined types were placed in this encounter.    Next visit:none

## 2025-07-09 PROBLEM — O99.019 MATERNAL ANEMIA IN PREGNANCY, ANTEPARTUM: Status: ACTIVE | Noted: 2025-07-09

## 2025-07-09 RX ORDER — FERROUS SULFATE 325(65) MG
325 TABLET ORAL
Qty: 60 TABLET | Refills: 10 | Status: SHIPPED | OUTPATIENT
Start: 2025-07-09

## 2025-07-23 DIAGNOSIS — O21.1 SEVERE HYPEREMESIS GRAVIDARUM: ICD-10-CM

## 2025-07-23 DIAGNOSIS — O09.90 SUPERVISION OF HIGH RISK PREGNANCY, ANTEPARTUM: ICD-10-CM

## 2025-07-23 DIAGNOSIS — F32.3 CURRENT SEVERE EPISODE OF MAJOR DEPRESSIVE DISORDER WITH PSYCHOTIC FEATURES WITHOUT PRIOR EPISODE: ICD-10-CM

## 2025-07-23 RX ORDER — ONDANSETRON 8 MG/1
8 TABLET, ORALLY DISINTEGRATING ORAL EVERY 8 HOURS PRN
Qty: 27 TABLET | Refills: 2 | Status: SHIPPED | OUTPATIENT
Start: 2025-07-23

## 2025-08-04 PROBLEM — Z3A.28 28 WEEKS GESTATION OF PREGNANCY: Status: ACTIVE | Noted: 2025-05-12

## 2025-08-05 ENCOUNTER — ROUTINE PRENATAL (OUTPATIENT)
Dept: OBSTETRICS AND GYNECOLOGY | Facility: CLINIC | Age: 30
End: 2025-08-05
Payer: COMMERCIAL

## 2025-08-05 VITALS — WEIGHT: 168 LBS | BODY MASS INDEX: 30.73 KG/M2 | DIASTOLIC BLOOD PRESSURE: 66 MMHG | SYSTOLIC BLOOD PRESSURE: 102 MMHG

## 2025-08-05 DIAGNOSIS — Z98.891 HISTORY OF CESAREAN SECTION: ICD-10-CM

## 2025-08-05 DIAGNOSIS — O09.90 SUPERVISION OF HIGH RISK PREGNANCY, ANTEPARTUM: Primary | ICD-10-CM

## 2025-08-05 DIAGNOSIS — O99.019 MATERNAL ANEMIA IN PREGNANCY, ANTEPARTUM: ICD-10-CM

## 2025-08-05 DIAGNOSIS — F32.3 CURRENT SEVERE EPISODE OF MAJOR DEPRESSIVE DISORDER WITH PSYCHOTIC FEATURES WITHOUT PRIOR EPISODE: ICD-10-CM

## 2025-08-05 DIAGNOSIS — Z3A.28 28 WEEKS GESTATION OF PREGNANCY: ICD-10-CM

## 2025-08-05 DIAGNOSIS — Z87.51 HISTORY OF PRETERM DELIVERY: ICD-10-CM

## 2025-08-05 LAB
GLUCOSE UR STRIP-MCNC: NEGATIVE MG/DL
PROT UR STRIP-MCNC: NEGATIVE MG/DL

## 2025-08-19 ENCOUNTER — ROUTINE PRENATAL (OUTPATIENT)
Dept: OBSTETRICS AND GYNECOLOGY | Facility: CLINIC | Age: 30
End: 2025-08-19
Payer: COMMERCIAL

## 2025-08-19 ENCOUNTER — LAB (OUTPATIENT)
Dept: LAB | Facility: HOSPITAL | Age: 30
End: 2025-08-19
Payer: COMMERCIAL

## 2025-08-19 VITALS — BODY MASS INDEX: 30.91 KG/M2 | DIASTOLIC BLOOD PRESSURE: 60 MMHG | WEIGHT: 169 LBS | SYSTOLIC BLOOD PRESSURE: 118 MMHG

## 2025-08-19 DIAGNOSIS — O09.90 SUPERVISION OF HIGH RISK PREGNANCY, ANTEPARTUM: Primary | ICD-10-CM

## 2025-08-19 DIAGNOSIS — F32.3 CURRENT SEVERE EPISODE OF MAJOR DEPRESSIVE DISORDER WITH PSYCHOTIC FEATURES WITHOUT PRIOR EPISODE: ICD-10-CM

## 2025-08-19 DIAGNOSIS — O09.90 SUPERVISION OF HIGH RISK PREGNANCY, ANTEPARTUM: ICD-10-CM

## 2025-08-19 DIAGNOSIS — Z98.891 HISTORY OF CESAREAN SECTION: ICD-10-CM

## 2025-08-19 DIAGNOSIS — Z3A.30 30 WEEKS GESTATION OF PREGNANCY: ICD-10-CM

## 2025-08-19 LAB
DEPRECATED RDW RBC AUTO: 40.5 FL (ref 37–54)
ERYTHROCYTE [DISTWIDTH] IN BLOOD BY AUTOMATED COUNT: 12.6 % (ref 12.3–15.4)
HCT VFR BLD AUTO: 31.3 % (ref 34–46.6)
HGB BLD-MCNC: 10.3 G/DL (ref 12–15.9)
MCH RBC QN AUTO: 29.6 PG (ref 26.6–33)
MCHC RBC AUTO-ENTMCNC: 32.9 G/DL (ref 31.5–35.7)
MCV RBC AUTO: 89.9 FL (ref 79–97)
PLATELET # BLD AUTO: 216 10*3/MM3 (ref 140–450)
PMV BLD AUTO: 10.7 FL (ref 6–12)
RBC # BLD AUTO: 3.48 10*6/MM3 (ref 3.77–5.28)
WBC NRBC COR # BLD AUTO: 11.37 10*3/MM3 (ref 3.4–10.8)

## 2025-08-19 PROCEDURE — 0502F SUBSEQUENT PRENATAL CARE: CPT

## 2025-08-19 PROCEDURE — 36415 COLL VENOUS BLD VENIPUNCTURE: CPT

## 2025-08-19 PROCEDURE — 85027 COMPLETE CBC AUTOMATED: CPT

## (undated) DEVICE — SUT PLAIN  3/0 CT1 27IN 842H

## (undated) DEVICE — GLV SURG BIOGEL LTX PF 7 1/2

## (undated) DEVICE — MAT PREVALON MOBL TRANSFR AIR WO/PAD 39X80IN

## (undated) DEVICE — STPLR SKIN SUBCUTICULAR INSORB 2030

## (undated) DEVICE — SUT VIC 2/0 CT1 27IN J339H BX/36

## (undated) DEVICE — PK C/SECT 10

## (undated) DEVICE — SOL IRR H2O BTL 1000ML STRL

## (undated) DEVICE — TRY SPINE BLCK WHITACRE 25G 3X5IN

## (undated) DEVICE — SUT GUT CHRM 1 CTX 36IN 905H

## (undated) DEVICE — SOL IRR NACL 0.9PCT BT 1000ML

## (undated) DEVICE — COATED VICRYL  (POLYGLACTIN 910) SUTURE, VIOLET BRAIDED, STERILE, SYNTHETIC ABSORBABLE SUTURE: Brand: COATED VICRYL

## (undated) DEVICE — PROXIMATE RH ROTATING HEAD SKIN STAPLERS (35 WIDE) CONTAINS 35 STAINLESS STEEL STAPLES: Brand: PROXIMATE